# Patient Record
Sex: MALE | Race: WHITE | NOT HISPANIC OR LATINO | Employment: OTHER | URBAN - METROPOLITAN AREA
[De-identification: names, ages, dates, MRNs, and addresses within clinical notes are randomized per-mention and may not be internally consistent; named-entity substitution may affect disease eponyms.]

---

## 2018-07-31 ENCOUNTER — HOSPITAL ENCOUNTER (INPATIENT)
Facility: HOSPITAL | Age: 73
LOS: 7 days | Discharge: HOME HEALTH | DRG: 623 | End: 2018-08-07
Attending: EMERGENCY MEDICINE | Admitting: PODIATRIST
Payer: MEDICARE

## 2018-07-31 ENCOUNTER — APPOINTMENT (INPATIENT)
Dept: NON INVASIVE DIAGNOSTICS | Facility: HOSPITAL | Age: 73
DRG: 623 | End: 2018-07-31
Payer: MEDICARE

## 2018-07-31 ENCOUNTER — APPOINTMENT (INPATIENT)
Dept: MRI IMAGING | Facility: HOSPITAL | Age: 73
DRG: 623 | End: 2018-07-31
Payer: MEDICARE

## 2018-07-31 ENCOUNTER — APPOINTMENT (INPATIENT)
Dept: RADIOLOGY | Facility: HOSPITAL | Age: 73
DRG: 623 | End: 2018-07-31
Payer: MEDICARE

## 2018-07-31 ENCOUNTER — APPOINTMENT (EMERGENCY)
Dept: RADIOLOGY | Facility: HOSPITAL | Age: 73
DRG: 623 | End: 2018-07-31
Payer: MEDICARE

## 2018-07-31 DIAGNOSIS — L02.619 CELLULITIS AND ABSCESS OF FOOT: Primary | ICD-10-CM

## 2018-07-31 DIAGNOSIS — M86.371 CHRONIC MULTIFOCAL OSTEOMYELITIS OF RIGHT FOOT (HCC): ICD-10-CM

## 2018-07-31 DIAGNOSIS — R31.9 HEMATURIA: ICD-10-CM

## 2018-07-31 DIAGNOSIS — I50.9 CONGESTIVE HEART FAILURE, UNSPECIFIED HF CHRONICITY, UNSPECIFIED HEART FAILURE TYPE (HCC): ICD-10-CM

## 2018-07-31 DIAGNOSIS — I25.119 CORONARY ARTERY DISEASE INVOLVING NATIVE HEART WITH ANGINA PECTORIS, UNSPECIFIED VESSEL OR LESION TYPE (HCC): ICD-10-CM

## 2018-07-31 DIAGNOSIS — S91.309A OPEN WOUND OF FOOT: ICD-10-CM

## 2018-07-31 DIAGNOSIS — L03.119 CELLULITIS AND ABSCESS OF FOOT: Primary | ICD-10-CM

## 2018-07-31 DIAGNOSIS — L03.115 CELLULITIS OF RIGHT FOOT: ICD-10-CM

## 2018-07-31 DIAGNOSIS — E11.9 DIET-CONTROLLED DIABETES MELLITUS (HCC): ICD-10-CM

## 2018-07-31 PROBLEM — I25.10 CORONARY ARTERY DISEASE: Status: ACTIVE | Noted: 2017-08-23

## 2018-07-31 PROBLEM — E03.9 HYPOTHYROIDISM: Status: ACTIVE | Noted: 2017-08-23

## 2018-07-31 PROBLEM — R60.9 EDEMA: Status: ACTIVE | Noted: 2017-09-18

## 2018-07-31 PROBLEM — G57.93 NEUROPATHY OF BOTH FEET: Status: ACTIVE | Noted: 2017-10-18

## 2018-07-31 PROBLEM — I73.9 PERIPHERAL VASCULAR DISEASE (HCC): Status: ACTIVE | Noted: 2017-08-23

## 2018-07-31 PROBLEM — E80.6 HYPERBILIRUBINEMIA: Status: ACTIVE | Noted: 2017-11-28

## 2018-07-31 PROBLEM — I50.814 BIVENTRICULAR HEART FAILURE WITH REDUCED LEFT VENTRICULAR FUNCTION (HCC): Status: ACTIVE | Noted: 2017-08-23

## 2018-07-31 LAB
ALBUMIN SERPL BCP-MCNC: 3.4 G/DL (ref 3.5–5)
ALP SERPL-CCNC: 124 U/L (ref 46–116)
ALT SERPL W P-5'-P-CCNC: 17 U/L (ref 12–78)
ANION GAP SERPL CALCULATED.3IONS-SCNC: 7 MMOL/L (ref 4–13)
APTT PPP: 43 SECONDS (ref 24–36)
AST SERPL W P-5'-P-CCNC: 26 U/L (ref 5–45)
ATRIAL RATE: 119 BPM
BASOPHILS # BLD AUTO: 0.03 THOUSANDS/ΜL (ref 0–0.1)
BASOPHILS NFR BLD AUTO: 0 % (ref 0–1)
BILIRUB SERPL-MCNC: 2.29 MG/DL (ref 0.2–1)
BUN SERPL-MCNC: 17 MG/DL (ref 5–25)
CALCIUM SERPL-MCNC: 9.3 MG/DL (ref 8.3–10.1)
CHLORIDE SERPL-SCNC: 98 MMOL/L (ref 100–108)
CO2 SERPL-SCNC: 32 MMOL/L (ref 21–32)
CREAT SERPL-MCNC: 1.12 MG/DL (ref 0.6–1.3)
CRP SERPL QL: 42.1 MG/L
EOSINOPHIL # BLD AUTO: 0.04 THOUSAND/ΜL (ref 0–0.61)
EOSINOPHIL NFR BLD AUTO: 0 % (ref 0–6)
ERYTHROCYTE [DISTWIDTH] IN BLOOD BY AUTOMATED COUNT: 16.2 % (ref 11.6–15.1)
ERYTHROCYTE [SEDIMENTATION RATE] IN BLOOD: 20 MM/HOUR (ref 0–10)
EST. AVERAGE GLUCOSE BLD GHB EST-MCNC: 128 MG/DL
GFR SERPL CREATININE-BSD FRML MDRD: 64 ML/MIN/1.73SQ M
GLUCOSE SERPL-MCNC: 91 MG/DL (ref 65–140)
HBA1C MFR BLD: 6.1 % (ref 4.2–6.3)
HCT VFR BLD AUTO: 49.6 % (ref 36.5–49.3)
HGB BLD-MCNC: 17.8 G/DL (ref 12–17)
INR PPP: 1.5 (ref 0.86–1.17)
LYMPHOCYTES # BLD AUTO: 1.43 THOUSANDS/ΜL (ref 0.6–4.47)
LYMPHOCYTES NFR BLD AUTO: 14 % (ref 14–44)
MCH RBC QN AUTO: 36.6 PG (ref 26.8–34.3)
MCHC RBC AUTO-ENTMCNC: 35.9 G/DL (ref 31.4–37.4)
MCV RBC AUTO: 102 FL (ref 82–98)
MONOCYTES # BLD AUTO: 0.57 THOUSAND/ΜL (ref 0.17–1.22)
MONOCYTES NFR BLD AUTO: 6 % (ref 4–12)
NEUTROPHILS # BLD AUTO: 7.89 THOUSANDS/ΜL (ref 1.85–7.62)
NEUTS SEG NFR BLD AUTO: 79 % (ref 43–75)
NRBC BLD AUTO-RTO: 0 /100 WBCS
PLATELET # BLD AUTO: 161 THOUSANDS/UL (ref 149–390)
PLATELET # BLD AUTO: 163 THOUSANDS/UL (ref 149–390)
PMV BLD AUTO: 10.9 FL (ref 8.9–12.7)
PMV BLD AUTO: 11 FL (ref 8.9–12.7)
POTASSIUM SERPL-SCNC: 3.8 MMOL/L (ref 3.5–5.3)
PROT SERPL-MCNC: 7.8 G/DL (ref 6.4–8.2)
PROTHROMBIN TIME: 18.2 SECONDS (ref 11.8–14.2)
QRS AXIS: 104 DEGREES
QRSD INTERVAL: 86 MS
QT INTERVAL: 354 MS
QTC INTERVAL: 474 MS
RBC # BLD AUTO: 4.86 MILLION/UL (ref 3.88–5.62)
SODIUM SERPL-SCNC: 137 MMOL/L (ref 136–145)
T WAVE AXIS: -35 DEGREES
URATE SERPL-MCNC: 5.2 MG/DL (ref 4.2–8)
VENTRICULAR RATE: 108 BPM
WBC # BLD AUTO: 9.96 THOUSAND/UL (ref 4.31–10.16)

## 2018-07-31 PROCEDURE — 93925 LOWER EXTREMITY STUDY: CPT

## 2018-07-31 PROCEDURE — 85730 THROMBOPLASTIN TIME PARTIAL: CPT | Performed by: EMERGENCY MEDICINE

## 2018-07-31 PROCEDURE — 73718 MRI LOWER EXTREMITY W/O DYE: CPT

## 2018-07-31 PROCEDURE — 87040 BLOOD CULTURE FOR BACTERIA: CPT | Performed by: STUDENT IN AN ORGANIZED HEALTH CARE EDUCATION/TRAINING PROGRAM

## 2018-07-31 PROCEDURE — 87070 CULTURE OTHR SPECIMN AEROBIC: CPT | Performed by: STUDENT IN AN ORGANIZED HEALTH CARE EDUCATION/TRAINING PROGRAM

## 2018-07-31 PROCEDURE — 87186 SC STD MICRODIL/AGAR DIL: CPT | Performed by: STUDENT IN AN ORGANIZED HEALTH CARE EDUCATION/TRAINING PROGRAM

## 2018-07-31 PROCEDURE — 84550 ASSAY OF BLOOD/URIC ACID: CPT | Performed by: STUDENT IN AN ORGANIZED HEALTH CARE EDUCATION/TRAINING PROGRAM

## 2018-07-31 PROCEDURE — 85025 COMPLETE CBC W/AUTO DIFF WBC: CPT | Performed by: EMERGENCY MEDICINE

## 2018-07-31 PROCEDURE — 85652 RBC SED RATE AUTOMATED: CPT | Performed by: EMERGENCY MEDICINE

## 2018-07-31 PROCEDURE — 71046 X-RAY EXAM CHEST 2 VIEWS: CPT

## 2018-07-31 PROCEDURE — 36415 COLL VENOUS BLD VENIPUNCTURE: CPT | Performed by: EMERGENCY MEDICINE

## 2018-07-31 PROCEDURE — 99285 EMERGENCY DEPT VISIT HI MDM: CPT

## 2018-07-31 PROCEDURE — 93005 ELECTROCARDIOGRAM TRACING: CPT

## 2018-07-31 PROCEDURE — 80053 COMPREHEN METABOLIC PANEL: CPT | Performed by: EMERGENCY MEDICINE

## 2018-07-31 PROCEDURE — 87205 SMEAR GRAM STAIN: CPT | Performed by: STUDENT IN AN ORGANIZED HEALTH CARE EDUCATION/TRAINING PROGRAM

## 2018-07-31 PROCEDURE — 85049 AUTOMATED PLATELET COUNT: CPT | Performed by: STUDENT IN AN ORGANIZED HEALTH CARE EDUCATION/TRAINING PROGRAM

## 2018-07-31 PROCEDURE — 87147 CULTURE TYPE IMMUNOLOGIC: CPT | Performed by: STUDENT IN AN ORGANIZED HEALTH CARE EDUCATION/TRAINING PROGRAM

## 2018-07-31 PROCEDURE — 87077 CULTURE AEROBIC IDENTIFY: CPT | Performed by: STUDENT IN AN ORGANIZED HEALTH CARE EDUCATION/TRAINING PROGRAM

## 2018-07-31 PROCEDURE — 93010 ELECTROCARDIOGRAM REPORT: CPT | Performed by: INTERNAL MEDICINE

## 2018-07-31 PROCEDURE — 93923 UPR/LXTR ART STDY 3+ LVLS: CPT

## 2018-07-31 PROCEDURE — 73630 X-RAY EXAM OF FOOT: CPT

## 2018-07-31 PROCEDURE — 86140 C-REACTIVE PROTEIN: CPT | Performed by: EMERGENCY MEDICINE

## 2018-07-31 PROCEDURE — 83036 HEMOGLOBIN GLYCOSYLATED A1C: CPT | Performed by: STUDENT IN AN ORGANIZED HEALTH CARE EDUCATION/TRAINING PROGRAM

## 2018-07-31 PROCEDURE — 93922 UPR/L XTREMITY ART 2 LEVELS: CPT | Performed by: SURGERY

## 2018-07-31 PROCEDURE — 93925 LOWER EXTREMITY STUDY: CPT | Performed by: SURGERY

## 2018-07-31 PROCEDURE — 85610 PROTHROMBIN TIME: CPT | Performed by: EMERGENCY MEDICINE

## 2018-07-31 RX ORDER — DABIGATRAN ETEXILATE 150 MG/1
150 CAPSULE, COATED PELLETS ORAL 2 TIMES DAILY
Status: DISCONTINUED | OUTPATIENT
Start: 2018-07-31 | End: 2018-08-04

## 2018-07-31 RX ORDER — DABIGATRAN ETEXILATE 150 MG/1
150 CAPSULE, COATED PELLETS ORAL 2 TIMES DAILY
COMMUNITY

## 2018-07-31 RX ORDER — POTASSIUM CHLORIDE 20MEQ/15ML
20 LIQUID (ML) ORAL 2 TIMES DAILY
Status: DISCONTINUED | OUTPATIENT
Start: 2018-07-31 | End: 2018-08-01

## 2018-07-31 RX ORDER — ALLOPURINOL 300 MG/1
300 TABLET ORAL
COMMUNITY
Start: 2018-05-07

## 2018-07-31 RX ORDER — METOLAZONE 2.5 MG/1
2.5 TABLET ORAL 3 TIMES WEEKLY
COMMUNITY

## 2018-07-31 RX ORDER — GABAPENTIN 300 MG/1
300 CAPSULE ORAL 3 TIMES DAILY
COMMUNITY
Start: 2017-11-28

## 2018-07-31 RX ORDER — METRONIDAZOLE 500 MG/1
500 TABLET ORAL EVERY 8 HOURS SCHEDULED
Status: DISCONTINUED | OUTPATIENT
Start: 2018-07-31 | End: 2018-08-02

## 2018-07-31 RX ORDER — UREA 10 %
500 LOTION (ML) TOPICAL DAILY
COMMUNITY

## 2018-07-31 RX ORDER — LEVOTHYROXINE SODIUM 0.05 MG/1
50 TABLET ORAL
Status: DISCONTINUED | OUTPATIENT
Start: 2018-08-01 | End: 2018-08-07 | Stop reason: HOSPADM

## 2018-07-31 RX ORDER — ACETAMINOPHEN 500 MG
500 TABLET ORAL EVERY 6 HOURS PRN
COMMUNITY

## 2018-07-31 RX ORDER — METOLAZONE 2.5 MG/1
2.5 TABLET ORAL 3 TIMES WEEKLY
Status: DISCONTINUED | OUTPATIENT
Start: 2018-08-01 | End: 2018-08-07 | Stop reason: HOSPADM

## 2018-07-31 RX ORDER — ACETAMINOPHEN 325 MG/1
650 TABLET ORAL EVERY 6 HOURS PRN
Status: DISCONTINUED | OUTPATIENT
Start: 2018-07-31 | End: 2018-08-07 | Stop reason: HOSPADM

## 2018-07-31 RX ORDER — ALLOPURINOL 100 MG/1
300 TABLET ORAL DAILY
Status: DISCONTINUED | OUTPATIENT
Start: 2018-08-01 | End: 2018-08-07 | Stop reason: HOSPADM

## 2018-07-31 RX ORDER — LEVOTHYROXINE SODIUM 0.05 MG/1
50 TABLET ORAL DAILY
COMMUNITY

## 2018-07-31 RX ORDER — COLCHICINE 0.6 MG/1
0.6 TABLET ORAL 2 TIMES DAILY
COMMUNITY
Start: 2017-08-23

## 2018-07-31 RX ORDER — EPLERENONE 25 MG/1
25 TABLET, FILM COATED ORAL DAILY
COMMUNITY
End: 2019-05-14

## 2018-07-31 RX ORDER — FUROSEMIDE 40 MG/1
40 TABLET ORAL DAILY
Status: DISCONTINUED | OUTPATIENT
Start: 2018-08-01 | End: 2018-08-07 | Stop reason: HOSPADM

## 2018-07-31 RX ORDER — GABAPENTIN 300 MG/1
300 CAPSULE ORAL 3 TIMES DAILY
Status: DISCONTINUED | OUTPATIENT
Start: 2018-07-31 | End: 2018-08-07 | Stop reason: HOSPADM

## 2018-07-31 RX ORDER — UREA 10 %
500 LOTION (ML) TOPICAL DAILY
Status: DISCONTINUED | OUTPATIENT
Start: 2018-08-01 | End: 2018-08-07 | Stop reason: HOSPADM

## 2018-07-31 RX ORDER — POTASSIUM CHLORIDE 750 MG/1
20 CAPSULE, EXTENDED RELEASE ORAL 2 TIMES DAILY
COMMUNITY

## 2018-07-31 RX ORDER — EPLERENONE 25 MG/1
25 TABLET, FILM COATED ORAL DAILY
Status: DISCONTINUED | OUTPATIENT
Start: 2018-08-01 | End: 2018-08-07 | Stop reason: HOSPADM

## 2018-07-31 RX ORDER — FUROSEMIDE 40 MG/1
40 TABLET ORAL DAILY
COMMUNITY
Start: 2018-02-15

## 2018-07-31 RX ORDER — COLCHICINE 0.6 MG/1
0.6 TABLET ORAL 2 TIMES DAILY
Status: DISCONTINUED | OUTPATIENT
Start: 2018-07-31 | End: 2018-08-03

## 2018-07-31 RX ADMIN — CEFAZOLIN SODIUM 2000 MG: 2 SOLUTION INTRAVENOUS at 17:56

## 2018-07-31 RX ADMIN — DABIGATRAN ETEXILATE MESYLATE 150 MG: 150 CAPSULE ORAL at 18:28

## 2018-07-31 RX ADMIN — GABAPENTIN 300 MG: 300 CAPSULE ORAL at 21:20

## 2018-07-31 RX ADMIN — METRONIDAZOLE 500 MG: 500 TABLET ORAL at 21:20

## 2018-07-31 RX ADMIN — COLCHICINE 0.6 MG: 0.6 TABLET, FILM COATED ORAL at 18:01

## 2018-07-31 RX ADMIN — POTASSIUM CHLORIDE 20 MEQ: 20 SOLUTION ORAL at 18:01

## 2018-07-31 RX ADMIN — METRONIDAZOLE 500 MG: 500 TABLET ORAL at 18:04

## 2018-07-31 RX ADMIN — ENOXAPARIN SODIUM 40 MG: 40 INJECTION SUBCUTANEOUS at 18:01

## 2018-07-31 NOTE — CASE MANAGEMENT
Initial Clinical Review    Admission: Date/Time/Statement: 7/31/18 @ 1555 Inpatient Written     Orders Placed This Encounter   Procedures    Inpatient Admission     Standing Status:   Standing     Number of Occurrences:   1     Order Specific Question:   Admitting Physician     Answer:   Grant Chamberlain [[id number]]     Order Specific Question:   Level of Care     Answer:   Med Surg [16]     Order Specific Question:   Estimated length of stay     Answer:   More than 2 Midnights     Order Specific Question:   Certification     Answer:   I certify that inpatient services are medically necessary for this patient for a duration of greater than two midnights  See H&P and MD Progress Notes for additional information about the patient's course of treatment  Comments:   IV abx, psb intervention         ED: Date/Time/Mode of Arrival:   ED Arrival Information     Expected Arrival Acuity Means of Arrival Escorted By Service Admission Type    - 7/31/2018 12:59 Urgent [facility]In [facility]iat[facility] Urgent    Arrival Complaint    wound check          Chief Complaint:   Chief Complaint   Patient presents with    Foot Swelling     pt c/o swelling and pain to right foot  thinks it is infected  just came back from a 9 days cruise  also states on the left foot one of the toes does not appear to be healing right from an old wound  History of Illness: Luke Nieves is a 74 y o  male who presents with right foot ulcer and left 3rd toe ulcer that he has been seeing Dr Dede Curtis for as an outpatient  He states that these have been wounds for about 2 weeks and prior to that they were just calluses  He saw Dr Winsome Lopez in the office today and was sent to the hospital given increasing redness  The patient denies vomiting, fevers, chills however he has had some nausea the past few days  He weightbears in normal shoes  He states he has diet-controlled diabetes but he has had neuropathy for a while and takes gabapentin for this    He states he has a had a left lower extremity stent placed by Dr Ashley Bragg in the [facility] about 3 years ago and follows up with him regularly  He states he is retired and was on a cruise in [place] and got home about 2 days ago  ED Vital Signs:   ED Triage Vitals   Temperature Pulse Respirations Blood Pressure SpO2   07/31/18 1308 07/31/18 1308 07/31/18 1308 07/31/18 1308 07/31/18 1308   98 4 °F (36 9 °C) (!) 108 18 128/97 96 %      Temp Source Heart Rate Source Patient Position - Orthostatic VS BP Location FiO2 (%)   07/31/18 1308 07/31/18 1550 07/31/18 1550 07/31/18 1550 --   Temporal Monitor Lying Right arm       Pain Score       07/31/18 1308       4        Wt Readings from Last 1 Encounters:   07/31/18 97 7 kg (215 lb 6 2 oz)       Vital Signs (abnormal): temp 99 9,     Abnormal Labs/Diagnostic Test Results:   Hemoglobin 17 8     Hematocrit 49 6          MCH 36 6     RDW 16 2     Neutrophils Relative 79     Neutrophils Absolute 7 89       Chloride 98     Alkaline Phosphatase 124     Albumin 3 4     Total Bilirubin 2 29       Sed Rate 20       CRP 42 1       Protime 18 2     INR 1 50       XR right and left foot:  No radiographic evidence of osteomyelitis  ED Treatment:   Medication Administration from 07/31/2018 1259 to 07/31/2018 1710     None          Past Medical/Surgical History:    Active Ambulatory Problems     Diagnosis Date Noted    [facility] failure [facility] function [facility] ) 08/23/2017    Coronary artery disease 08/23/2017    Diet-controlled diabetes mellitus ([facility] ) 08/23/2017    Edema 09/18/2017    Hyperbilirubinemia 11/28/2017    Hypothyroidism 08/23/2017    Neuropathy of both feet 10/18/2017    Peripheral vascular disease ([facility] ) 08/23/2017    Essential hypertension 09/18/2013     Resolved Ambulatory Problems     Diagnosis Date Noted    No Resolved Ambulatory Problems     Past Medical History:   Diagnosis Date    Cardiac disease     CHF (congestive heart failure) (HCC)     Coronary artery disease     Diabetes mellitus ([facility] )     Disease of thyroid gland     Fracture, humerus     GERD (gastroesophageal reflux disease)     Hypertension     Prediabetes     Wrist fracture        Admitting Diagnosis: Cellulitis and abscess of foot [L03 119, L02 619]  Open wound of foot [S91 309A]  Visit for wound check [Z51 89]  Coronary artery disease involving native heart with angina pectoris, unspecified vessel or lesion type ([place] [address] ) [I25 119]  Congestive heart failure, unspecified HF chronicity, unspecified heart failure type ([place] [address] ) [I50 9]    Age/Sex: 74 y o  male    Assessment/Plan:   3 68-year-old male with right plantar 5th metatarsal ulcer Jennings 2 with cellulitis secondary to pressure and neuropathy  2   Graham Hoose 1 ulcer to the distal left 3rd toe secondary to pressure and neuropathy  3  DTI dorsal right hallux  4   AFib with history of CABG x4 in 2011-c/w pradaxa  5  History of CHF  6  Diet-controlled diabetes mellitus with neuropathy-c/w gabapentin  7  Hypothyroidism  8  HTN  9  Hyperbilirubinemia  10  peripheral vascular disease-left lower extremity stent placement roughly 3 years ago  11  Hx Gout  12  Code status:  Level 1  13    DVT prophylaxis:  Lovenox     -ordered right foot MRI to rule out osteomyelitis and abscess to plantar 5th metatarsal wound  -bilateral foot x-rays show no radiographic evidence of osteomyelitis  -patient is diet controlled diabetes, have ordered new HbA1c  -ESR, CRP pending  -took wound cultures to right foot wound  -there is surrounding cellulitis to the right foot wound however there is no pamela pus, no malodor, this wound does not probe to bone but does have tracking  -distal left 3rd toe appears to be stable without probe to bone, no clinical signs of infection  -dressed wounds with Betadine, did dry sterile dressing  -due to nonpalpable right DP and the fold Doppler signal have ordered leads to evaluate vascular status given history of left lower extremity stent 3 years ago  -place patient on Ancef/Flagyl as patient has failed p o  doxy outpatient and placed consult for Infectious Disease for IV antibiotic recommendations  -placed slim consult for medical management of #4-9 and have continued home meds for these  -will evaluate results of leads and may consider vascular surgery consult  -will see tomorrow with attending and update plan    Admission Orders:  Surgical shoe  EKG  CXR  MRI right foot  VAS lower limb arterial duplex  Consult ID, internal med  Sequential compression device  Blood and wound cxs pending    Scheduled Meds:   Current Facility-Administered Medications:  acetaminophen 650 mg Oral Q6H PRN   [START ON 8/1/2018] allopurinol 300 mg Oral Daily   cefazolin 2,000 mg Intravenous Q8H   colchicine 0 6 mg Oral BID   dabigatran etexilate 150 mg Oral BID   enoxaparin 40 mg Subcutaneous BID   [START ON 8/1/2018] eplerenone 25 mg Oral Daily   [START ON 8/1/2018] furosemide 40 mg Oral Daily   gabapentin 300 mg Oral TID   [START ON 8/1/2018] levothyroxine 50 mcg Oral Early Morning   [START ON 8/1/2018] magnesium gluconate 500 mg Oral Daily   [START ON 8/1/2018] metolazone 2 5 mg Oral Once per day on Mon Wed Fri   metroNIDAZOLE 500 mg Oral Q8H [facility]   potassium chloride 20 mEq Oral BID     Continuous Infusions:    PRN Meds:   acetaminophen    Thank you,  Jazmín Aqq  291 Utilization Review Department  Phone: [phone]; Fax [phone]  ATTENTION: The Network Utilization Review Department is now centralized for our 9 Facilities  Make a note that we have a new phone and fax numbers for our Department  Please call with any questions or concerns to [phone] and carefully follow the prompts so that you are directed to the right person  All voicemails are confidential  Fax any determinations, approvals, denials, and requests for initial or continue stay review clinical to [phone]   Due to HIGH CALL volume, it would be easier if you could please send faxed requests to expedite your requests and in part, help us provide discharge notifications faster

## 2018-07-31 NOTE — H&P
H&P Exam - Danis Oro 74 y o  male MRN: [id number]    Unit/Bed#: ED 17 Encounter: [id number]      Assessment/Plan     3 25-year-old male with right plantar 5th metatarsal ulcer Jennings 2 with cellulitis secondary to pressure and neuropathy  2   Doy Diones 1 ulcer to the distal left 3rd toe secondary to pressure and neuropathy  3  DTI dorsal right hallux  4   AFib with history of CABG x4 in 2011-c/w pradaxa  5  History of CHF  6  Diet-controlled diabetes mellitus with neuropathy-c/w gabapentin  7  Hypothyroidism  8  HTN  9  Hyperbilirubinemia  10  peripheral vascular disease-left lower extremity stent placement roughly 3 years ago  11  Hx Gout  12  Code status:  Level 1  13    DVT prophylaxis:  Lovenox    -ordered right foot MRI to rule out osteomyelitis and abscess to plantar 5th metatarsal wound  -bilateral foot x-rays show no radiographic evidence of osteomyelitis  -patient is diet controlled diabetes, have ordered new HbA1c  -ESR, CRP pending  -patient is afebrile, no acute distress, no leukocytosis  -took wound cultures to right foot wound  -there is surrounding cellulitis to the right foot wound however there is no pamela pus, no malodor, this wound does not probe to bone but does have tracking  -distal left 3rd toe appears to be stable without probe to bone, no clinical signs of infection  -dressed wounds with Betadine, did dry sterile dressing  -due to nonpalpable right DP and the fold Doppler signal have ordered leads to evaluate vascular status given history of left lower extremity stent 3 years ago  -place patient on Ancef/Flagyl as patient has failed p o  doxy outpatient and placed consult for Infectious Disease for IV antibiotic recommendations  -placed slim consult for medical management of #4-9 and have continued home meds for these  -will evaluate results of leads and may consider vascular surgery consult  -will see tomorrow with attending and update plan      History of Present Illness    HPI:  Wili Neville is a 74 y o  male who presents with right foot ulcer and left 3rd toe ulcer that he has been seeing Dr Gely Rivera for as an outpatient  He states that these have been wounds for about 2 weeks and prior to that they were just calluses  He saw Dr Renu Tello in the office today and was sent to the hospital given increasing redness  The patient denies vomiting, fevers, chills however he has had some nausea the past few days  He weightbears in normal shoes  He states he has diet-controlled diabetes but he has had neuropathy for a while and takes gabapentin for this  He states he has a had a left lower extremity stent placed by Dr José Antonio Valenzuela in the [facility] about 3 years ago and follows up with him regularly  He states he is retired and was on a cruise in [place] and got home about 2 days ago  Review of Systems   Constitutional: Negative for activity change, appetite change, chills, fatigue and fever  HENT: Positive for congestion  Respiratory: Negative for apnea, cough, chest tightness, shortness of breath and wheezing  Cardiovascular: Positive for leg swelling  Negative for chest pain  Gastrointestinal: Positive for nausea  Negative for abdominal distention and abdominal pain  Musculoskeletal: Positive for joint swelling  Skin: Positive for color change and wound  Neurological: Positive for numbness  Negative for dizziness and weakness  Psychiatric/Behavioral: Negative for agitation, confusion and self-injury  The patient is not nervous/anxious          Historical Information   Past Medical History:   Diagnosis Date    Cardiac disease     [facility]estive [facility] failure) [facility] )     Coronary artery disease     [facility]     Disease of thyroid gland     Fracture, humerus     GERD (gastroesophageal reflux disease)     Hypertension     Prediabetes     Wrist fracture      Past Surgical History:   Procedure Laterality Date    CORONARY ARTERY BYPASS GRAFT Social History   History   Alcohol Use    Yes     Comment: "very little"     History   Drug Use No     History   Smoking Status    Former Smoker   Smokeless Tobacco    Never Used     Family History: non-contributory    Meds/Allergies   all medications and allergies reviewed  Allergies   Allergen Reactions    Ace Inhibitors     Other      High fructose corn syrup    Statins        Objective   Vitals: Blood pressure 128/97, pulse (!) 108, temperature 98 4 °F (36 9 °C), temperature source Temporal, resp  rate 18, weight 97 5 kg (215 lb), SpO2 96 %  No intake or output data in the 24 hours ending 07/31/18 1549    Invasive Devices     Peripheral Intravenous Line            Peripheral IV 07/31/18 Left Antecubital less than 1 day                Physical Exam   Constitutional: He is oriented to person, place, and time  He appears well-developed and well-nourished  HENT:   Head: Normocephalic and atraumatic  Eyes: Conjunctivae are normal  Pupils are equal, round, and reactive to light  Neck: Normal range of motion  Neck supple  Cardiovascular: Normal rate and normal heart sounds  Right PT pulse palpable, right DP pulse nonpalpable however multiple biphasic  Left DP/PT pulses palpable  Pedal hair is absent  Spider veins are present  Capillary refill time is less than 3 sec  Atrophic skin is noted  Pulmonary/Chest: Effort normal  No respiratory distress  He has wheezes  Abdominal: Soft  He exhibits no distension  Musculoskeletal: Normal range of motion  Digital range of motion is intact  Manual muscle testing is 5/5 in all 4 planes  There is no calf tenderness to palpation bilateral   However there is plus one pitting edema to bilateral lower extremity  Hammertoes present bilateral     Neurological: He is alert and oriented to person, place, and time  He has normal reflexes  Skin: Skin is warm and dry  Ulcer right sub met 5 measures 5 x 2 7 cm    There is a central area of depth and measures 0 8 x 0 8 x 0 2 cm and tracks laterally by 1 4 cm and tracks medially by 0 5 cm  This however does not probe to bone  Wound appears granular mixed with devitalized tissue without pamela pus however there is mild renita wound cellulitis  There is also a DTI to the dorsal right hallux IPJ, callus distal left hallux, ulcer Jennings 1 to the distal tip of the left 3rd toe  This does not have any pamela pus, no ascending cellulitis, no malodor, no active drainage  Psychiatric: He has a normal mood and affect  His behavior is normal                  Code Status: No Order  Advance Directive and Living Will:      Power of :    POLST:      Counseling / Coordination of Care  Total floor / unit time spent today 30 minutes  Greater than 50% of total time was spent with the patient and / or family counseling and / or coordination of care

## 2018-07-31 NOTE — ED PROVIDER NOTES
History  Chief Complaint   Patient presents with    Foot Swelling     pt c/o swelling and pain to right foot  thinks it is infected  just came back from a 9 days cruise  also states on the left foot one of the toes does not appear to be healing right from an old wound  History provided by:  Patient   used: No    Leg Pain   Location:  Foot  Injury: no    Foot location:  Sole of R foot  Pain details:     Quality:  Aching (Wound)    Radiates to:  Does not radiate    Severity:  Moderate    Onset quality:  Gradual    Timing:  Intermittent    Progression:  Waxing and waning  Chronicity:  Recurrent  Dislocation: no    Foreign body present:  Unable to specify  Tetanus status:  Unknown  Prior injury to area:  Unable to specify  Relieved by:  Nothing  Worsened by:  Nothing  Ineffective treatments:  None tried  Associated symptoms: no back pain, no fever and no neck pain        Prior to Admission Medications   Prescriptions Last Dose Informant Patient Reported? Taking?    Barberry-Oreg Grape-Goldenseal (BERBERINE COMPLEX PO) 7/30/2018 at Unknown time  Yes Yes   Sig: Take 400 mg by mouth daily   Turmeric Curcumin 500 MG CAPS 7/30/2018 at Unknown time  Yes Yes   Sig: Take 1,000 mg by mouth daily   Ubiquinol 300 MG CAPS 7/30/2018 at Unknown time  Yes Yes   Sig: Take 300 mg by mouth daily   acetaminophen (TYLENOL) 500 mg tablet 7/30/2018 at Unknown time  Yes Yes   Sig: Take 500 mg by mouth every 6 (six) hours as needed for mild pain   allopurinol (ZYLOPRIM) 300 mg tablet 7/30/2018 at Unknown time  Yes Yes   Sig: Take 300 mg by mouth   colchicine (COLCRYS) 0 6 mg tablet 7/30/2018 at Unknown time  Yes Yes   Sig: Take 0 6 mg by mouth 2 (two) times a day     dabigatran etexilate (PRADAXA) 150 mg capsu 7/30/2018 at Unknown time  Yes Yes   Sig: Take 150 mg by mouth 2 (two) times a day   eplerenone (INSPRA) 25 mg tablet 7/30/2018 at Unknown time  Yes Yes   Sig: Take 25 mg by mouth daily   furosemide (LASIX) 40 mg tablet 7/30/2018 at Unknown time  Yes Yes   Sig: Take 40 mg by mouth daily   gabapentin (NEURONTIN) 300 mg capsule 7/30/2018 at Unknown time  Yes Yes   Sig: Take 300 mg by mouth 3 (three) times a day     levothyroxine 50 mcg tablet 7/31/2018 at Unknown time  Yes Yes   Sig: Take 50 mcg by mouth daily   magnesium gluconate (MAGONATE) 500 mg tablet 7/30/2018 at Unknown time  Yes Yes   Sig: Take 500 mg by mouth daily   metolazone (ZAROXOLYN) 2 5 mg tablet Past Week at Unknown time  Yes Yes   Sig: Take 2 5 mg by mouth 3 (three) times a week   potassium chloride (MICRO-K) 10 MEQ CR capsule 7/30/2018 at Unknown time  Yes Yes   Sig: Take 20 mEq by mouth 2 (two) times a day      Facility-Administered Medications: None       Past Medical History:   Diagnosis Date    Cardiac disease     [facility]F [facility]congestive [facility] failure) ([facility] )     Coronary artery disease     Diabetes mellitus ([facility] )     Disease of thyroid gland     Fracture, humerus     GERD (gastroesophageal reflux disease)     Hypertension     Prediabetes     Wrist fracture        Past Surgical History:   Procedure Laterality Date    CORONARY ARTERY BYPASS GRAFT         History reviewed  No pertinent family history  I have reviewed and agree with the history as documented  Social History   Substance Use Topics    Smoking status: Former Smoker    Smokeless tobacco: Never Used    Alcohol use Yes      Comment: "very little"        Review of Systems   Constitutional: Negative for activity change, chills and fever  HENT: Negative for facial swelling, sore throat and trouble swallowing  Eyes: Negative for pain and visual disturbance  Respiratory: Negative for cough, chest tightness and shortness of breath  Cardiovascular: Negative for chest pain and leg swelling  Gastrointestinal: Negative for abdominal pain, blood in stool, diarrhea, nausea and vomiting  Genitourinary: Negative for dysuria and flank pain     Musculoskeletal: Negative for back pain, neck pain and neck stiffness  Skin: Negative for pallor and rash  Allergic/Immunologic: Negative for environmental allergies and immunocompromised state  Neurological: Negative for dizziness and headaches  Hematological: Negative for adenopathy  Does not bruise/bleed easily  Psychiatric/Behavioral: Negative for agitation and behavioral problems  All other systems reviewed and are negative  Physical Exam  Physical Exam   Constitutional: He is oriented to person, place, and time  He appears well-developed and well-nourished  No distress  HENT:   Head: Normocephalic and atraumatic  Eyes: EOM are normal    Neck: Normal range of motion  Neck supple  Cardiovascular: Normal rate, regular rhythm, normal heart sounds and intact distal pulses  Pulmonary/Chest: Effort normal and breath sounds normal    Abdominal: Soft  Bowel sounds are normal  There is no tenderness  There is no rebound and no guarding  Musculoskeletal: Normal range of motion  Open wound at lateral aspect of sole of right foot, no active drainage; wound over 3rd toe left foot without active drainage   Neurological: He is alert and oriented to person, place, and time  Skin: Skin is warm and dry  Psychiatric: He has a normal mood and affect  Nursing note and vitals reviewed        Vital Signs  ED Triage Vitals   Temperature Pulse Respirations Blood Pressure SpO2   07/31/18 1308 07/31/18 1308 07/31/18 1308 07/31/18 1308 07/31/18 1308   98 4 °F (36 9 °C) (!) 108 18 128/97 96 %      Temp Source Heart Rate Source Patient Position - Orthostatic VS BP Location FiO2 (%)   07/31/18 1308 07/31/18 1550 07/31/18 1550 07/31/18 1550 --   Temporal Monitor Lying Right arm       Pain Score       07/31/18 1308       4           Vitals:    07/31/18 1308 07/31/18 1550   BP: 128/97 125/87   Pulse: (!) 108 94   Patient Position - Orthostatic VS:  Lying       Visual Acuity      ED Medications  Medications - No data to display    Diagnostic Studies  Results Reviewed     Procedure Component Value Units Date/Time    Uric acid [[id number]] Collected:  07/31/18 1604    Lab Status: In process Specimen:  Blood from Arm, Left Updated:  07/31/18 1607    Wound culture and Gram stain [[id number]]     Lab Status:  No result Specimen:  Wound from Foot, Right     Protime-INR [[id number]] Collected:  07/31/18 1544    Lab Status: In process Specimen:  Blood from Arm, Left Updated:  07/31/18 1557    APTT [[id number]] Collected:  07/31/18 1544    Lab Status: In process Specimen:  Blood from Arm, Left Updated:  07/31/18 1557    Sedimentation rate, automated [[id number]]  (Abnormal) Collected:  07/31/18 1434    Lab Status:  Final result Specimen:  Blood from Arm, Left Updated:  07/31/18 1556     Sed Rate 20 (H) mm/hour     C-reactive protein [[id number]]  (Abnormal) Collected:  07/31/18 1434    Lab Status:  Final result Specimen:  Blood from Arm, Left Updated:  07/31/18 1551     CRP 42 1 (H) mg/L     Comprehensive metabolic panel [[id number]]  (Abnormal) Collected:  07/31/18 1434    Lab Status:  Final result Specimen:  Blood from Arm, Left Updated:  07/31/18 1506     Sodium 137 mmol/L      Potassium 3 8 mmol/L      Chloride 98 (L) mmol/L      CO2 32 mmol/L      Anion Gap 7 mmol/L      BUN 17 mg/dL      Creatinine 1 12 mg/dL      Glucose 91 mg/dL      Calcium 9 3 mg/dL      AST 26 U/L      ALT 17 U/L      Alkaline Phosphatase 124 (H) U/L      Total Protein 7 8 g/dL      Albumin 3 4 (L) g/dL      Total Bilirubin 2 29 (H) mg/dL      eGFR 64 ml/min/1 73sq m     Narrative:         National Kidney Disease Education Program recommendations are as follows:  GFR calculation is accurate only with a steady state creatinine  Chronic Kidney disease less than 60 ml/min/1 73 sq  meters  Kidney failure less than 15 ml/min/1 73 sq  meters      CBC and differential [[id number]]  (Abnormal) Collected:  07/31/18 1434    Lab Status:  Final result Specimen:  Blood from Arm, Left Updated:  07/31/18 1446     WBC 9 96 Thousand/uL      RBC 4 86 Million/uL      Hemoglobin 17 8 (H) g/dL      Hematocrit 49 6 (H) %       (H) fL      MCH 36 6 (H) pg      MCHC 35 9 g/dL      RDW 16 2 (H) %      MPV 10 9 fL      Platelets 314 Thousands/uL      nRBC 0 /100 WBCs      Neutrophils Relative 79 (H) %      Lymphocytes Relative 14 %      Monocytes Relative 6 %      Eosinophils Relative 0 %      Basophils Relative 0 %      Neutrophils Absolute 7 89 (H) Thousands/µL      Lymphocytes Absolute 1 43 Thousands/µL      Monocytes Absolute 0 57 Thousand/µL      Eosinophils Absolute 0 04 Thousand/µL      Basophils Absolute 0 03 Thousands/µL                  XR foot 3+ views LEFT   Final Result by Norma So MD (07/31 1542)      No radiographic evidence of osteomyelitis  Workstation performed: [id number]         XR foot 3+ views RIGHT   Final Result by Norma So MD (07/31 1541)      No radiographic evidence of osteomyelitis  Workstation performed: [id number]                    Procedures  Procedures       Phone Contacts  ED Phone Contact    ED Course  ED Course as of Jul 31 1615   Tue Jul 31, 2018   1514  Labs, XR reviewed, CBC; CMP within normal limits;  discussed with Podiatry resident, will see the patient  [phone] Patient seen by Podiatry, will admit to Dr Sabrina Mullins Service  MDM  Number of Diagnoses or Management Options  Open wound of foot: new and requires workup  Diagnosis management comments: Patient is a 78-year-old male, history of peripheral vascular disease, atrial fibrillation, on Pradaxa, comes in with complaint of worsening right foot wound, was seen at [place] [address] office today, was told to go to the ER, denies fever, chills  No prior calls received in the ER for patient's visit, therefore ER evaluation was done   On exam no acute distress:  Vital signs are stable, afebrile, ulcerated wound noted at the sole of the right foot laterally, a dry wound also noted at the 3rd to the left foot  Will check labs including CBC, CMP, sed rate, CRP, x-rays of foot for possible osteomyelitis versus deep infection; will consult Podiatry  Amount and/or Complexity of Data Reviewed  Clinical lab tests: ordered and reviewed  Tests in the radiology section of CPT®: ordered and reviewed  Tests in the medicine section of CPT®: reviewed and ordered  Discuss the patient with other providers: yes  Independent visualization of images, tracings, or specimens: yes      CritCare Time    Disposition  Final diagnoses:   Open wound of foot     Time reflects when diagnosis was documented in both MDM as applicable and the Disposition within this note     Time User Action Codes Description Comment    7/31/2018  3:49 PM Becky Tran Add [L03 119,  L02 619] Cellulitis and abscess of foot     7/31/2018  3:49 PM Becky Tran Add [I50 9] Congestive heart failure, unspecified HF chronicity, unspecified heart failure type ([place] [address] )     7/31/2018  3:55 PM Becky Tran Add [I25 119] Coronary artery disease involving native heart with angina pectoris, unspecified vessel or lesion type ([place] [address] )     7/31/2018  3:55 PM Becky Tran Modify [I25 119] Coronary artery disease involving native heart with angina pectoris, unspecified vessel or lesion type ([place] [address] )     7/31/2018  4:07  [place] [address] Open wound of foot       ED Disposition     ED Disposition Condition Comment    Admit  Case was discussed with Podiatry and the patient's admission status was agreed to be Admission Status: inpatient status to the service of Dr Sherrie Matta  Follow-up Information    None         Patient's Medications   Discharge Prescriptions    No medications on file     No discharge procedures on file      ED Provider  Electronically Signed by           Ryland Novoa MD  07/31/18 0855

## 2018-08-01 ENCOUNTER — ANESTHESIA EVENT (INPATIENT)
Dept: PERIOP | Facility: HOSPITAL | Age: 73
DRG: 623 | End: 2018-08-01
Payer: MEDICARE

## 2018-08-01 PROBLEM — M86.371 CHRONIC MULTIFOCAL OSTEOMYELITIS OF RIGHT FOOT (HCC): Status: ACTIVE | Noted: 2018-07-31

## 2018-08-01 LAB
ALBUMIN SERPL BCP-MCNC: 2.9 G/DL (ref 3.5–5)
ALP SERPL-CCNC: 105 U/L (ref 46–116)
ALT SERPL W P-5'-P-CCNC: 15 U/L (ref 12–78)
ANION GAP SERPL CALCULATED.3IONS-SCNC: 6 MMOL/L (ref 4–13)
AST SERPL W P-5'-P-CCNC: 22 U/L (ref 5–45)
BASOPHILS # BLD AUTO: 0.04 THOUSANDS/ΜL (ref 0–0.1)
BASOPHILS NFR BLD AUTO: 1 % (ref 0–1)
BILIRUB SERPL-MCNC: 1.73 MG/DL (ref 0.2–1)
BUN SERPL-MCNC: 18 MG/DL (ref 5–25)
CALCIUM SERPL-MCNC: 9.1 MG/DL (ref 8.3–10.1)
CHLORIDE SERPL-SCNC: 100 MMOL/L (ref 100–108)
CO2 SERPL-SCNC: 29 MMOL/L (ref 21–32)
CREAT SERPL-MCNC: 1.01 MG/DL (ref 0.6–1.3)
EOSINOPHIL # BLD AUTO: 0.08 THOUSAND/ΜL (ref 0–0.61)
EOSINOPHIL NFR BLD AUTO: 1 % (ref 0–6)
ERYTHROCYTE [DISTWIDTH] IN BLOOD BY AUTOMATED COUNT: 16.2 % (ref 11.6–15.1)
GFR SERPL CREATININE-BSD FRML MDRD: 73 ML/MIN/1.73SQ M
GLUCOSE SERPL-MCNC: 97 MG/DL (ref 65–140)
HCT VFR BLD AUTO: 48.2 % (ref 36.5–49.3)
HGB BLD-MCNC: 16.7 G/DL (ref 12–17)
LYMPHOCYTES # BLD AUTO: 2.32 THOUSANDS/ΜL (ref 0.6–4.47)
LYMPHOCYTES NFR BLD AUTO: 27 % (ref 14–44)
MCH RBC QN AUTO: 35.8 PG (ref 26.8–34.3)
MCHC RBC AUTO-ENTMCNC: 34.6 G/DL (ref 31.4–37.4)
MCV RBC AUTO: 103 FL (ref 82–98)
MONOCYTES # BLD AUTO: 0.49 THOUSAND/ΜL (ref 0.17–1.22)
MONOCYTES NFR BLD AUTO: 6 % (ref 4–12)
NEUTROPHILS # BLD AUTO: 5.6 THOUSANDS/ΜL (ref 1.85–7.62)
NEUTS SEG NFR BLD AUTO: 66 % (ref 43–75)
NRBC BLD AUTO-RTO: 0 /100 WBCS
PLATELET # BLD AUTO: 151 THOUSANDS/UL (ref 149–390)
PMV BLD AUTO: 10.6 FL (ref 8.9–12.7)
POTASSIUM SERPL-SCNC: 3.9 MMOL/L (ref 3.5–5.3)
PROT SERPL-MCNC: 6.9 G/DL (ref 6.4–8.2)
RBC # BLD AUTO: 4.67 MILLION/UL (ref 3.88–5.62)
SODIUM SERPL-SCNC: 135 MMOL/L (ref 136–145)
WBC # BLD AUTO: 8.53 THOUSAND/UL (ref 4.31–10.16)

## 2018-08-01 PROCEDURE — 80053 COMPREHEN METABOLIC PANEL: CPT | Performed by: STUDENT IN AN ORGANIZED HEALTH CARE EDUCATION/TRAINING PROGRAM

## 2018-08-01 PROCEDURE — 99223 1ST HOSP IP/OBS HIGH 75: CPT | Performed by: INTERNAL MEDICINE

## 2018-08-01 PROCEDURE — 85025 COMPLETE CBC W/AUTO DIFF WBC: CPT | Performed by: STUDENT IN AN ORGANIZED HEALTH CARE EDUCATION/TRAINING PROGRAM

## 2018-08-01 PROCEDURE — 99232 SBSQ HOSP IP/OBS MODERATE 35: CPT | Performed by: INTERNAL MEDICINE

## 2018-08-01 RX ORDER — ONDANSETRON 2 MG/ML
4 INJECTION INTRAMUSCULAR; INTRAVENOUS EVERY 6 HOURS PRN
Status: DISCONTINUED | OUTPATIENT
Start: 2018-08-01 | End: 2018-08-07 | Stop reason: HOSPADM

## 2018-08-01 RX ORDER — METOPROLOL SUCCINATE 25 MG/1
25 TABLET, EXTENDED RELEASE ORAL DAILY
Status: DISCONTINUED | OUTPATIENT
Start: 2018-08-01 | End: 2018-08-07 | Stop reason: HOSPADM

## 2018-08-01 RX ORDER — METOPROLOL TARTRATE 5 MG/5ML
5 INJECTION INTRAVENOUS EVERY 4 HOURS PRN
Status: DISCONTINUED | OUTPATIENT
Start: 2018-08-01 | End: 2018-08-07 | Stop reason: HOSPADM

## 2018-08-01 RX ORDER — METOCLOPRAMIDE HYDROCHLORIDE 5 MG/ML
10 INJECTION INTRAMUSCULAR; INTRAVENOUS ONCE
Status: COMPLETED | OUTPATIENT
Start: 2018-08-01 | End: 2018-08-01

## 2018-08-01 RX ORDER — POTASSIUM CHLORIDE 20 MEQ/1
40 TABLET, EXTENDED RELEASE ORAL ONCE
Status: COMPLETED | OUTPATIENT
Start: 2018-08-01 | End: 2018-08-01

## 2018-08-01 RX ADMIN — ONDANSETRON 4 MG: 2 INJECTION INTRAMUSCULAR; INTRAVENOUS at 23:11

## 2018-08-01 RX ADMIN — FUROSEMIDE 40 MG: 40 TABLET ORAL at 08:42

## 2018-08-01 RX ADMIN — METOPROLOL TARTRATE 5 MG: 5 INJECTION, SOLUTION INTRAVENOUS at 15:36

## 2018-08-01 RX ADMIN — COLCHICINE 0.6 MG: 0.6 TABLET, FILM COATED ORAL at 08:42

## 2018-08-01 RX ADMIN — METRONIDAZOLE 500 MG: 500 TABLET ORAL at 21:07

## 2018-08-01 RX ADMIN — DABIGATRAN ETEXILATE MESYLATE 150 MG: 150 CAPSULE ORAL at 08:43

## 2018-08-01 RX ADMIN — LEVOTHYROXINE SODIUM 50 MCG: 50 TABLET ORAL at 05:11

## 2018-08-01 RX ADMIN — GABAPENTIN 300 MG: 300 CAPSULE ORAL at 21:07

## 2018-08-01 RX ADMIN — ONDANSETRON 4 MG: 2 INJECTION INTRAMUSCULAR; INTRAVENOUS at 14:44

## 2018-08-01 RX ADMIN — METOCLOPRAMIDE 10 MG: 5 INJECTION, SOLUTION INTRAMUSCULAR; INTRAVENOUS at 18:54

## 2018-08-01 RX ADMIN — METRONIDAZOLE 500 MG: 500 TABLET ORAL at 05:11

## 2018-08-01 RX ADMIN — ALLOPURINOL 300 MG: 100 TABLET ORAL at 08:43

## 2018-08-01 RX ADMIN — Medication 500 MG: at 08:42

## 2018-08-01 RX ADMIN — POTASSIUM CHLORIDE 20 MEQ: 20 SOLUTION ORAL at 08:42

## 2018-08-01 RX ADMIN — GABAPENTIN 300 MG: 300 CAPSULE ORAL at 08:42

## 2018-08-01 RX ADMIN — METOLAZONE 2.5 MG: 2.5 TABLET ORAL at 08:42

## 2018-08-01 RX ADMIN — EPLERENONE 25 MG: 25 TABLET, FILM COATED ORAL at 08:43

## 2018-08-01 RX ADMIN — ENOXAPARIN SODIUM 40 MG: 40 INJECTION SUBCUTANEOUS at 08:42

## 2018-08-01 RX ADMIN — CEFAZOLIN SODIUM 2000 MG: 2 SOLUTION INTRAVENOUS at 00:11

## 2018-08-01 RX ADMIN — CEFAZOLIN SODIUM 2000 MG: 2 SOLUTION INTRAVENOUS at 08:42

## 2018-08-01 RX ADMIN — POTASSIUM CHLORIDE 40 MEQ: 1500 TABLET, EXTENDED RELEASE ORAL at 12:39

## 2018-08-01 NOTE — CONSULTS
Consultation - Infectious Disease   Zoran Luke 74 y o  male MRN: [id number]  Unit/Bed#: E4 -01 Encounter: [id number]      IMPRESSION & RECOMMENDATIONS:   Impression/Recommendations:  1  Right foot cellulitis  No history of MDR infections  Seems to be improving on current antibiotics  No associated fevers  Patient remains systemically well, nontoxic     - continue with IV cefazolin  - continue with oral metronidazole  - follow up pending blood cultures  - followup superficial wound culture  - serial exams    2  Right foot 5th submetatarsal ulceration with underlying osteomyelitis  MRI suggestive of osteomyelitis of the 5th metatarsal head and suggestion of  associatedseptic arthritis  Podiatry recommending resection  Patient is still deciding if he wants to proceed with surgery  I explained to the patient that   There is a very low probability of complete cure of infection with antibiotics alone  - antibiotic plan as above  - follow-up surgical plans  - close podiatry follow-up ongoing  - continue with local wound care and dressing changes     3  Peripheral vascular disease  Likely contributing to development of #2  Vascular surgery consult is pending  4  Diabetes mellitus type 2 with neuropathy  Also likely contributing to development of severe foot infection  5  Hyperbilirubinemia  Antibiotics:   IV cefazolin/ oral Flagyl #2    Discussed above plan in detail with patient and with Dr Sherrie Matta  Thank you for this consultation  We will follow along with you  HISTORY OF PRESENT ILLNESS:  Reason for Consult: Foot cellulitis and osteomyelitis    HPI: Zoran Luke is a 74 y o  male  With history of diabetes, neuropathy, peripheral vascular disease admitted on 07/31 due to chronic right foot ulceration with surrounding worsening redness, swelling  Patient has been following with Dr Sandee Armas as an outpatient for management of his chronic ulcerations    Yesterday in the office, he was seen by Dr Winsome Lopez and the worsening redness, swelling involving the right ulceration was noted so patient was sent to the hospital for further workup  No obvious drainage  No fevers, chills, nausea, vomiting, diarrhea  Was not on antibiotics as outpatient  Patient was started on IV cefazolin and Flagyl and has noted some improvement in the redness and swelling in the past 24 hours  REVIEW OF SYSTEMS:  A complete system-based review of systems is otherwise negative  PAST MEDICAL HISTORY:  Past Medical History:   Diagnosis Date    Cardiac disease     [facility]congestive [facility] failure) ([facility] )     Coronary artery disease     Diabetes mellitus ([facility] )     Disease of thyroid gland     Fracture, humerus     GERD (gastroesophageal reflux disease)     Hypertension     Prediabetes     Wrist fracture      Past Surgical History:   Procedure Laterality Date    CORONARY ARTERY BYPASS GRAFT         FAMILY HISTORY:  Non-contributory    SOCIAL HISTORY:  History   Alcohol Use    Yes     Comment: "very little"     History   Drug Use No     History   Smoking Status    Former Smoker   Smokeless Tobacco    Never Used       ALLERGIES:  Allergies   Allergen Reactions    Ace Inhibitors     Other      High fructose corn syrup    Statins        MEDICATIONS:  All current active medications have been reviewed      PHYSICAL EXAM:  Vitals:  HR:  [] 98  Resp:  [18] 18  BP: (118-134)/(72-97) 118/74  SpO2:  [94 %-97 %] 97 %  Temp (24hrs), Av 1 °F (37 3 °C), Min:98 4 °F (36 9 °C), Max:99 9 °F (37 7 °C)  Current: Temperature: 99 °F (37 2 °C)     Physical Exam:  General:  Well-nourished, well-developed, in no acute distress  Eyes:  Conjunctive clear with no hemorrhages or effusions  Oropharynx:  No ulcers, no lesions  Neck:  Supple, no lymphadenopathy  Lungs:  Clear to auscultation bilaterally, no accessory muscle use  Cardiac:  Regular rate and rhythm, no murmurs  Abdomen:  Soft, non-tender, non-distended  Extremities:   Bilateral lower extremity venous stasis changes   Right lower extremity edema   right foot 5th metatarsal head ulceration with mild surrounding erythema and necrotic changes  No obvious drainage  Skin:  No rashes, no ulcers  Neurological:  Moves all four extremities spontaneously, sensation grossly intact    LABS, IMAGING, & OTHER STUDIES:  Lab Results:  I have personally reviewed pertinent labs  Results from last 7 days  Lab Units 08/01/18  0445 07/31/18  1434   SODIUM mmol/L 135* 137   POTASSIUM mmol/L 3 9 3 8   CHLORIDE mmol/L 100 98*   CO2 mmol/L 29 32   ANION GAP mmol/L 6 7   BUN mg/dL 18 17   CREATININE mg/dL 1 01 1 12   EGFR ml/min/1 73sq m 73 64   GLUCOSE RANDOM mg/dL 97 91   CALCIUM mg/dL 9 1 9 3   AST U/L 22 26   ALT U/L 15 17   ALK PHOS U/L 105 124*   TOTAL PROTEIN g/dL 6 9 7 8   BILIRUBIN TOTAL mg/dL 1 73* 2 29*       Results from last 7 days  Lab Units 08/01/18  0445 07/31/18  1739 07/31/18  1434   WBC Thousand/uL 8 53  --  9 96   HEMOGLOBIN g/dL 16 7  --  17 8*   PLATELETS Thousands/uL 151 163 161       Imaging Studies:   I have personally reviewed pertinent imaging study reports and images in PACS  MRI of the foot shows ulceration at the 5th submetatarsal region with underlying marrow edema of the head of the 5th metatarsal suggesting osteomyelitis  Small joint effusion at the 5th metatarsal pharyngeal joint  EKG, Pathology, and Other Studies:   I have personally reviewed pertinent reports

## 2018-08-01 NOTE — PROGRESS NOTES
Progress Note - Podiatry  Evelyn Roque 74 y o  male MRN: [id number]  Unit/Bed#: E4 -01 Encounter: [id number]    Assessment/Plan:  3  73 y/o male with R plantar 5th met ulcer Jennings 3 with cellulitis and likely OM 2/2 pressure and neuropathy  2  DTI R dorsal hallux  3  Jennings 1 ulcer distal L 3rd toe 2/2 pressure and neuropathy  4  Afib with Hx CABGx4 2011-c/w pradaxa  5  CHF  6  DM type 2 with neuropathy-diet-controlled, HbA1c: 6 1%  7  Hypothyroidism  8  HTN  9  Hyperbilirubinemia  10  PVD-LLE stent placed roughly 3 years ago  6  History of gout  12  Code status:  Level 1  13  DVT prophylaxis:  Lovenox    -no leukocytosis patient fever 99F  -plan for patient to OR tomorrow 08/02/2018 for right partial 5th ray amputation at 7:30 a m , appreciate medical clearance for the OR tomorrow  -patient NPO at midnight, will hold a m  dose Lovenox  -right foot MRI reads marrow edema in the 11 mm segment of right 5th metatarsal head to suggest osteomyelitis with small joint effusion at the 5th MPJ, normal proximal phalanx of the 5th  -LEADs: R DAVID 1 13, , TOE 51, L:  DAVID 1 3, MET 67, TOE 0:  Will consider vascular surgery consult given toe pressure of 0 on the left and left 3rd toe ulcer  -NWB R foot, b/l surgical shoes  -PT/OT/CM consults placed  -wound cultures and blood cultures are still pending   -patient to continue Ancef/Flagyl however defer to ID for recommendation  -will change dressing later today with attending     Subjective/Objective   Chief Complaint:   Chief Complaint   Patient presents with    Foot Swelling     pt c/o swelling and pain to right foot  thinks it is infected  just came back from a 9 days cruise  also states on the left foot one of the toes does not appear to be healing right from an old wound  Subjective: 74 y o  y/o male was seen and evaluated at bedside  Blood pressure 118/74, pulse 98, temperature 99 °F (37 2 °C), temperature source Temporal, resp   rate 18, height 6' 2" (1 88 m), weight 97 7 kg (215 lb 6 2 oz), SpO2 97 %  ,Body mass index is 27 65 kg/m²  Invasive Devices     Peripheral Intravenous Line            Peripheral IV 07/31/18 Left Antecubital less than 1 day                Physical Exam:   General: Alert, cooperative and no distress  Lungs: Non labored breathing  Heart: Positive S1, S2  Abdomen: Soft, non-tender  Extremity:       Neurovascular status gross motor function at baseline  Dressing left clean dry and intact  Lab, Imaging and other studies:   CBC:   Lab Results   Component Value Date    WBC 8 53 08/01/2018    HGB 16 7 08/01/2018    HCT 48 2 08/01/2018     (H) 08/01/2018     08/01/2018    MCH 35 8 (H) 08/01/2018    MCHC 34 6 08/01/2018    RDW 16 2 (H) 08/01/2018    MPV 10 6 08/01/2018    NRBC 0 08/01/2018       Imaging: I have personally reviewed pertinent films in PACS  EKG, Pathology, and Other Studies: I have personally reviewed pertinent reports    VTE Pharmacologic Prophylaxis: Enoxaparin (Lovenox)

## 2018-08-01 NOTE — PLAN OF CARE
Patient had no complaints this admission of nausea or vomiting  Ate food brought in from [address] and began vomiting  Podiatry contacted  Zofran given  Also has diarrhea  Attributing this to ABT        GASTROINTESTINAL - ADULT     Minimal or absence of nausea and/or vomiting Progressing        SKIN/TISSUE INTEGRITY - ADULT     Incision(s), wounds(s) or drain site(s) healing without S/S of infection Progressing

## 2018-08-01 NOTE — SOCIAL WORK
Met with pt and friend Joel Davis in room to completed assessment and explain role  Pt was not feeling up to answering questions and stated his friend Joel Davis would be able to answer questions  No PCP listed and wrote info link on discharge instruction sheets  Pt lives in [place] with Joel Davis in a 1 story house with 2 steps to enter  Pt was independent with ADLs, amb with no devices and drove a car  Pt is currently NWB on R foot  No DME and pt was not open with any VNA  Pt is a bundle, but not home bound at this time for VNA  Pt has no hx of MH or drugs  Pt drinks alcohol very little  Pt does not have a POA/Living Will and did not want any information   is Tatiana Paz at [phone]  Joel Davis will transport pt home when medical cleared  Pt is a bundle, but was not home bound  Currently pt is NWB on R foot  Unclear what discharge needs pt might have  CM will follow

## 2018-08-01 NOTE — ANESTHESIA PREPROCEDURE EVALUATION
Review of Systems/Medical History  Patient summary reviewed  Chart reviewed      Cardiovascular  Hypertension , CAD , History of CABG, Dysrhythmias , atrial fibrillation, CHF , PVD,   Comment: ECHO 2015 mildly reduced EF and valves ok,  Pulmonary  Smoker ex-smoker  ,        GI/Hepatic    GERD ,   Comment: Pt currently has N/V          Endo/Other  Diabetes , History of thyroid disease , hypothyroidism,      GYN  Negative gynecology ROS          Hematology   Musculoskeletal       Neurology    Diabetic neuropathy,    Psychology   Negative psychology ROS              Physical Exam    Airway    Mallampati score: I  TM Distance: <3 FB  Neck ROM: full     Dental   No notable dental hx     Cardiovascular  Rhythm: regular, Rate: normal,     Pulmonary  Pulmonary exam normal     Other Findings        Anesthesia Plan  ASA Score- 3     Anesthesia Type- general with ASA Monitors  Additional Monitors:   Airway Plan: LMA  Comment: c      Plan Factors- Patient instructed to abstain from smoking on day of procedure  Patient did not smoke on day of surgery  Induction- intravenous  Postoperative Plan- Plan for postoperative opioid use  Informed Consent- Anesthetic plan and risks discussed with patient and legal guardian  I personally reviewed this patient with the CRNA  Discussed and agreed on the Anesthesia Plan with the CRNA  Louisa Hart

## 2018-08-01 NOTE — PLAN OF CARE
Problem: DISCHARGE PLANNING - CARE MANAGEMENT  Goal: Discharge to post-acute care or home with appropriate resources  INTERVENTIONS:  - Conduct assessment to determine patient/family and health care team treatment goals, and need for post-acute services based on payer coverage, community resources, and patient preferences, and barriers to discharge  - Address psychosocial, clinical, and financial barriers to discharge as identified in assessment in conjunction with the patient/family and health care team  - Arrange appropriate level of post-acute services according to patients   needs and preference and payer coverage in collaboration with the physician and health care team  - Communicate with and update the patient/family, physician, and health care team regarding progress on the discharge plan  - Arrange appropriate transportation to post-acute venues  Outcome: Progressing  Pt is a bundle, but was not home bound  Currently pt is NWB on R foot  Unclear what discharge needs pt might have  CM will follow

## 2018-08-01 NOTE — PROGRESS NOTES
Patient continues to vomit green colored emesis  Patient states he's extremely weak  Podiatry contacted again for plan  Podiatry department called back  Nurse made department aware of patient's status  They asked to contact YAMILEX Lou called  Took verbal order with readback for Reglan 10mg IV  Held all oral medications and antibiotics at this time

## 2018-08-01 NOTE — CONSULTS
Inpatient Medical Consultation - [facility] Internal Medicine    Patient Information: Bibi Godwin 74 y o  male MRN: [id number]  Unit/Bed#: E4 -01 Encounter: [id number]  PCP: No primary care provider on file  Date of Admission:  7/31/2018  Date of Consultation: 08/01/18  Requesting Physician: Riddhi Lees MD    Reason For Consultation:   Medical management    Assessment/Plan:    · Right plantar 5th metatarsal ulcer and distal left 3rd toe ulcer  · Plan for MRI to rule out osteomyelitis  · Empiric antibiotics IV cefazolin and oral metronidazole  · Follow-up cultures  · Further management as per podiatry    · CAD status post CABG  · Patient not have any active chest pain, dyspnea or palpitations    · Atrial fibrillation  · Rate controlled on beta-blocker, Pradaxa for anticoagulation, as per Cardiology okay to withhold anticoagulation    · History of CHF-unspecified  · Unclear whether this is systolic or diastolic, patient maintained on Lasix at home, continue    · Diabetes mellitus  · Diet controlled, monitor Accu-Cheks, sliding scale for coverage  · HbA1c 6 1    · Hypothyroidism  · Continue with levothyroxine    · Hypertension  · Continue with metoprolol     · Hyperlipidemia  · Patient states he does not have history of hyperlipidemia and states he is allergic to statin    · History of peripheral vascular disease  · Vascular surgery consulted    Thank you for this interesting consult, will continue to follow along  VTE Prophylaxis: Reason for no pharmacologic prophylaxis Pradaxa  / reason for no mechanical VTE prophylaxis LE wounds     Counseling / Coordination of Care Time: 30 minutes  Greater than 50% of total time spent on patient counseling and coordination of care  Collaboration of Care:  Were Recommendations Directly Discussed with Primary Treatment Team? - Yes     History of Present Illness:    Bibi Godwin is a 74 y o  male who is originally admitted to the Podiatry service on 7/31/2018 due to right foot pain  We are consulted for medical management  Patient has past medical history significant for CAD status post CABG x4 in 2011, atrial fibrillation on Pradaxa, hypertension, hyperlipidemia, diabetes mellitus, chronic congestive heart failure, unspecified  Patient has a right foot ulcer and left 3rd toe ulcer for which patient has been following outpatient without improvement  Patient having worsening pain with difficulty movement and increased redness to his foot  Denies any nausea, vomiting, fever, chills  Review of Systems:    Review of Systems   Constitutional: Negative  HENT: Negative  Eyes: Negative  Respiratory: Negative  Cardiovascular: Negative  Gastrointestinal: Negative  Endocrine: Negative  Genitourinary: Negative  Musculoskeletal: Positive for joint swelling and myalgias  Skin: Positive for wound  Allergic/Immunologic: Negative  Neurological: Negative  Hematological: Negative  Psychiatric/Behavioral: Negative  Past Medical and Surgical History:     Past Medical History:   Diagnosis Date    Cardiac disease     [facility]     Coronary artery disease     Diabetes mellitus ([facility] )     Disease of thyroid gland     Fracture, humerus     GERD (gastroesophageal reflux disease)     Hypertension     Prediabetes     Wrist fracture        Past Surgical History:   Procedure Laterality Date    CORONARY ARTERY BYPASS GRAFT         Meds/Allergies:    all medications and allergies reviewed    Allergies:    Allergies   Allergen Reactions    Ace Inhibitors     Other      High fructose corn syrup    Statins        Social History:     Marital Status: Single    Substance Use History:   History   Alcohol Use    Yes     Comment: "very little"     History   Smoking Status    Former Smoker   Smokeless Tobacco    Never Used     History   Drug Use No       Family History:    non-contributory    Physical Exam:     Vitals: Blood Pressure: 118/74 (08/01/18 0727)  Pulse: 98 (08/01/18 0727)  Temperature: 99 °F (37 2 °C) (08/01/18 0727)  Temp Source: Temporal (08/01/18 0727)  Respirations: 18 (08/01/18 0727)  Height: 6' 2" (188 cm) (07/31/18 1712)  Weight - Scale: 97 7 kg (215 lb 6 2 oz) (07/31/18 1712)  SpO2: 97 % (08/01/18 0727)    Constitutional: Patient is oriented to person, place and time, no acute distress  HEENT:  Normocephalic, atraumatic, EOMI, PERRLA, no scleral icterus, no pallor, moist oral mucosa  Neck:  Supple, no masses, no thyromegaly, no bruits Normal range of motion  Lymph nodes:  No lymphadenopathy  Cardiovascular: Normal S1S2, RRR, No murmurs/rubs/gallops appreciated  Pulmonary: Clear to auscultation bilaterally, No rhonchi/rales/wheezing appreciated  Abdominal: Soft, Bowel sounds present, Non-tender, Non-distended, No rebound/guarding, no hepatomegaly   Musculoskeletal: No tenderness/abnormality   Extremities:  1+ bilateral lower extremity pitting edema Peripheral pulses palpable and equal bilaterally  Neurological: Cranial nerves II-XII grossly intact, sensation intact, otherwise no focal neurological symptoms  Skin:  Right lower extremity in dressing, ulcer to right foot measuring 5 x 2 7 cm        Additional Data:     Lab Results: I have personally reviewed pertinent reports          Results from last 7 days  Lab Units 08/01/18  0445   WBC Thousand/uL 8 53   HEMOGLOBIN g/dL 16 7   HEMATOCRIT % 48 2   PLATELETS Thousands/uL 151   NEUTROS PCT % 66   LYMPHS PCT % 27   MONOS PCT % 6   EOS PCT % 1       Results from last 7 days  Lab Units 08/01/18  0445   SODIUM mmol/L 135*   POTASSIUM mmol/L 3 9   CHLORIDE mmol/L 100   CO2 mmol/L 29   BUN mg/dL 18   CREATININE mg/dL 1 01   CALCIUM mg/dL 9 1   TOTAL PROTEIN g/dL 6 9   BILIRUBIN TOTAL mg/dL 1 73*   ALK PHOS U/L 105   ALT U/L 15   AST U/L 22   GLUCOSE RANDOM mg/dL 97       Results from last 7 days  Lab Units 07/31/18  1544   INR  1 50*       Imaging: I have personally reviewed pertinent reports  Xr Chest Pa & Lateral    Result Date: 7/31/2018  Narrative: CHEST INDICATION:   pre op clearance  COMPARISON:  None EXAM PERFORMED/VIEWS:  XR CHEST PA & LATERAL FINDINGS:  Median sternotomy wires appear intact  Mild cardiomegaly is present  The lungs are clear  No pneumothorax or pleural effusion  Osseous structures appear within normal limits for patient age  Impression: 1  Mild cardiomegaly 2  Clear lungs  Workstation performed: [id number]     Xr Foot 3+ Views Left    Result Date: 7/31/2018  Narrative: LEFT FOOT INDICATION:   Wound  COMPARISON:  None VIEWS:  XR FOOT 3+ VW LEFT FINDINGS: There is no acute fracture or dislocation  Degenerative changes of the 1st MTP joint  No radiographic evidence of osteomyelitis is present  No lytic or blastic lesions seen  Soft tissues are unremarkable  Impression: No radiographic evidence of osteomyelitis  Workstation performed: [id number]     Xr Foot 3+ Views Right    Result Date: 7/31/2018  Narrative: RIGHT FOOT INDICATION:   Wound over lateral aspect of sole of Right foot  COMPARISON:  None VIEWS:  XR FOOT 3+ VW RIGHT FINDINGS: There is no acute fracture or dislocation  No significant degenerative changes  No radiographic evidence of osteomyelitis is present  No lytic or blastic lesions seen  Soft tissues are unremarkable  Impression: No radiographic evidence of osteomyelitis  Workstation performed: [id number]     Mri Foot/forefoot Toest Right Wo Contrast    Result Date: 8/1/2018  Narrative: MRI RIGHT FOOT INDICATION:   r/o om  COMPARISON: Previous the radiograph from July 31, 2018 TECHNIQUE:  MR sequences were obtained of the right foot including the following:  Localizer, sagittal T1/STIR, coronal T1/T2 fat sat, axial T2 fat sat/PD  Images were acquired on a 1 5 Sheila unit   Gadolinium was not used FINDINGS: SUBCUTANEOUS TISSUES: An ulceration is seen in the 5th submetatarsal region BONES:  There is no marrow abnormality noted in the head of the 5th metatarsal in about 11 mm longest segment suggest osteomyelitis  There is joint effusion is also noted at the 5th metatarsophalangeal joint  The 5th proximal phalanx appears unremarkable FIRST MTP JOINT:  Intact  SESAMOID BONES:  Intact  OTHER ARTICULAR SURFACE:  Normal  PLANTAR FASCIA:  Intact  LISFRANC LIGAMENT:  Intact  FOREFOOT TENDONS: Intact  INTERMETATARSAL REGIONS: No bursitis or Fuchs's neuroma  MUSCULATURE: Intact  Impression: Ulceration at the 5th submetatarsal region with underlying marrow edema in the 11 mm long segment of the head of the 5th metatarsal suggest osteomyelitis  Small joint effusion at the level of the 5th metatarsophalangeal joint  Normal 5th proximal phalanx Workstation performed: [id number]     Vas Lower Limb Arterial Duplex, Complete Bilateral    Result Date: 7/31/2018  Narrative:  [facility] REPORT CLINICAL: Indications: Patient presents with an ulcer on his right plantar 5th digit metatarsal and left 3rd digit x 2 weeks  Patient has bilateral foot pain and states that the pain has improved after he started antibiotics  Operative History: 2011 CABG 2015 Left lower extremity calf arterial stent Risk Factors The patient has history of HTN, Diabetes, CHF, biventricular heart failure and CAD  Clinical Right Pressure:  114/ mm Hg, Left Pressure: IV site  FINDINGS:  Segment                Right                Left                                          Waveform   PSV  EDV  Waveform  PSV  EDV  Post  Tibial           Triphasic                                Ant   Tibial            Biphasic             Biphasic            Peroneal               Triphasic            Biphasic            Common Femoral Artery             100   24             98   12  Prox Profunda                      72   18             80    0  Prox SFA                          124   20            146   14  Mid SFA                           104   18            105    0 Dist SFA                          150   20            123    0  Proximal Pop                       90   23             53   10  Distal Pop                         86   21             68    0  Dist Post Tibial                  100   28             95    0  Dist  Ant  Tibial                  43   10             46   11  Dist Peroneal                     114   27             73   10     CONCLUSION: Impression: RIGHT LOWER LIMB: Diffuse atherosclerotic disease throughout the femoropopliteal arteries without evidence of significant focal stenosis  Findings suggestive of tibioperoneal disease  Ankle/Brachial index: 1 13, within normal limits  PVR/ PPG tracings are normal  Metatarsal pressure of 140 mmHg  Great toe pressure of 51 mmHg, borderline within healing range for a diabetic  LEFT LOWER LIMB: Diffuse atherosclerotic disease throughout the femoropopliteal arteries without evidence of significant focal stenosis  Findings suggestive of tibioperoneal disease  Ankle/Brachial index: 1 3, within normal limits  PVR/ PPG tracings are dampened  Metatarsal pressure of 67 mmHg  Great toe pressure of 0 mmHg  There is no prior exam for comparison  SIGNATURE: Electronically Signed by: Declan Daniel MD, RPVI on 2018-07-31 11:12:31 PM    ** Please Note: This note has been constructed using a voice recognition system   **

## 2018-08-01 NOTE — CONSULTS
Consult - Cardiology   Pauline Acosta 74 y o  male MRN: [id number]  Unit/Bed#: E4 -01 Encounter: [id number]        Reason For Consult:  Risk stratification for podiatric surgery               Assessment and Plan:     1  Rt 5th toe ulceration/cellulitis: Resection forthcoming by podiatry   2  Cardiac risk for podiatric surgery:    -Likely not more than intermediate as pt is stable from cardiac perspective with plan for rather low risk procedure  No additional cardiac testing needs to precede surgery  Okay to withhold anticoagulation (see below)  3  PAD with previous LLE angioplasty/stenting  Current LEADS abnormal with incidental notation of Lt great toe pressure 0  Involvement of vascular surgery at discretion of podiatry  4  Atrial fibrillation:     -Patient presents in AFib with slightly accelerated ventricular rate (110s)  Allegedly on no AV blocking Rx or antiarrhythmic prior to admission ~~> will order metoprolol (routine oral and IV p r n ) following heart rate response    -Okay to stop Pradaxa for surgery with out any bridging  Resume anticoagulation when acceptable to Podiatry       History Of Present Illness: This gentleman is a 67-year-old who was referred to the emergency department from the office of his podiatrist with report of pain of his right foot  He has subsequently admitted to the service of Podiatry with cellulitis, 5th toe ulceration, and probable osteomyelitis  Anticipating amutation of his 5th toe, cardiology consultation is requested for operative risk stratification  Mr Zell Pallas has a history of cardiac problems including CAD with CABG x4-2011, atrial fibrillation with Pradaxa anticoagulation, hypertension, dyslipidemia, and chronic CHF-unspecified  This patient is followed by a cardiologist closer to his home  He has not been hospitalized for any cardiac related problems in recent years  He reports having had a stress test 1-2 years ago with favorable results  Echocardiogram, 6/30/2015, at [facility] was technically difficult with report of mild concentric LVH, probable mild reduction in LVEF, with mild biatrial dilatation, dilated IVC, aortic sclerosis, mild MR, and moderate TR  Functionally the patient reports ability to walk at least 100 yd without chest pain or dyspnea  He has had no symptoms of tachyarrhythmia, near-syncope, syncope, orthopnea, PND, or lower extremity edema  Past Medical History:        Past Medical History:   Diagnosis Date    Cardiac disease     [facility]     Coronary artery disease     [facility] ([facility] )     Disease of thyroid gland     Fracture, humerus     GERD (gastroesophageal reflux disease)     Hypertension     Prediabetes     Wrist fracture     Past Surgical History:   Procedure Laterality Date    CORONARY ARTERY BYPASS GRAFT          Allergy:        Allergies   Allergen Reactions    Ace Inhibitors     Other      High fructose corn syrup    Statins        Medications:       Prior to Admission medications    Medication Sig Start Date End Date Taking?  Authorizing Provider   acetaminophen (TYLENOL) 500 mg tablet Take 500 mg by mouth every 6 (six) hours as needed for mild pain   Yes Historical Provider, MD   allopurinol (ZYLOPRIM) 300 mg tablet Take 300 mg by mouth 5/7/18  Yes Historical Provider, MD Alvarez-Oreg Grape-Goldenseal (BERBERINE COMPLEX PO) Take 400 mg by mouth daily   Yes Historical Provider, MD   colchicine (COLCRYS) 0 6 mg tablet Take 0 6 mg by mouth 2 (two) times a day   8/23/17  Yes Historical Provider, MD   dabigatran etexilate (PRADAXA) 150 mg capsu Take 150 mg by mouth 2 (two) times a day   Yes Historical Provider, MD   eplerenone (INSPRA) 25 mg tablet Take 25 mg by mouth daily   Yes Historical Provider, MD   furosemide (LASIX) 40 mg tablet Take 40 mg by mouth daily 2/15/18  Yes Historical Provider, MD   gabapentin (NEURONTIN) 300 mg capsule Take 300 mg by mouth 3 (three) times a day   11/28/17  Yes Historical Provider, MD   levothyroxine 50 mcg tablet Take 50 mcg by mouth daily   Yes Historical Provider, MD   magnesium gluconate (MAGONATE) 500 mg tablet Take 500 mg by mouth daily   Yes Historical Provider, MD   metolazone (ZAROXOLYN) 2 5 mg tablet Take 2 5 mg by mouth 3 (three) times a week   Yes Historical Provider, MD   potassium chloride (MICRO-K) 10 MEQ CR capsule Take 20 mEq by mouth 2 (two) times a day   Yes Historical Provider, MD   Turmeric Curcumin 500 MG CAPS Take 1,000 mg by mouth daily   Yes Historical Provider, MD   Ubiquinol 300 MG CAPS Take 300 mg by mouth daily   Yes Historical Provider, MD       Family History:     History reviewed  No pertinent family history  Social History:       Social History     Social History    Marital status: Single     Spouse name: N/A    Number of children: N/A    Years of education: N/A     Social History Main Topics    Smoking status: Former Smoker    Smokeless tobacco: Never Used    Alcohol use Yes      Comment: "very little"    Drug use: No    Sexual activity: Not Asked     Other Topics Concern    None     Social History Narrative    None       ROS:  Symptoms per HPI  Some dysesthesias of his limbs  Chronic stasis changes of the bilateral lower extremities    Exam:  General:  Pleasant, normally conversant, and comfortable-appearing  Head: Normocephalic, atraumatic  Eyes:  EOMI  Pupils - equal, round, reactive to accomodation  No icterus  Normal Conjunctiva  Oropharynx:  Moist without lesion  Neck:  No gross bruit or JVD  Heart:  Currently regular with slightly increased rate  Soft murmur at left sternal border  Lungs:  Earlean Landsman moderate effort and air exchange with no rales/rhonchi/wheeze  Abdomen:  Soft and nontender with normal bowel sounds  No organomegaly or mass  Lower Limbs:  Some circumferential brawny changes consistent with venous stasis    Trivial edema  Musculoskeletal: Independent movement of limbs observed, Formal ROM and strength eval not performed  Neurologic:    Oriented to: person , place, situation  Cranial Nerves: grossly intact - vision, smell, taste, and hearing  were not tested       Motor function:grossly normal, symmetric   Sensation: Was not tested    DATA:      ECG:                 Atrial fibrillation with ventricular rate approximately 110 beats per minute  Nonspecific ST T wave abnormalities  No prior ECG for comparison

## 2018-08-02 ENCOUNTER — ANESTHESIA (INPATIENT)
Dept: PERIOP | Facility: HOSPITAL | Age: 73
DRG: 623 | End: 2018-08-02
Payer: MEDICARE

## 2018-08-02 ENCOUNTER — APPOINTMENT (INPATIENT)
Dept: RADIOLOGY | Facility: HOSPITAL | Age: 73
DRG: 623 | End: 2018-08-02
Payer: MEDICARE

## 2018-08-02 PROBLEM — I71.4 ABDOMINAL AORTIC ANEURYSM (AAA) WITHOUT RUPTURE (HCC): Status: ACTIVE | Noted: 2018-08-02

## 2018-08-02 PROBLEM — I70.239 ATHEROSCLEROSIS OF NATIVE ARTERY OF RIGHT LOWER EXTREMITY WITH ULCERATION (HCC): Status: ACTIVE | Noted: 2017-08-23

## 2018-08-02 PROBLEM — I70.201 ATHEROSCLEROSIS OF ARTERY OF RIGHT LOWER EXTREMITY (HCC): Status: ACTIVE | Noted: 2017-08-23

## 2018-08-02 PROBLEM — I87.2 VENOUS INSUFFICIENCY (CHRONIC) (PERIPHERAL): Status: ACTIVE | Noted: 2018-08-02

## 2018-08-02 LAB
ALBUMIN SERPL BCP-MCNC: 3.1 G/DL (ref 3.5–5)
ALP SERPL-CCNC: 96 U/L (ref 46–116)
ALT SERPL W P-5'-P-CCNC: 13 U/L (ref 12–78)
ANION GAP SERPL CALCULATED.3IONS-SCNC: 10 MMOL/L (ref 4–13)
AST SERPL W P-5'-P-CCNC: 25 U/L (ref 5–45)
BASOPHILS # BLD AUTO: 0.02 THOUSANDS/ΜL (ref 0–0.1)
BASOPHILS NFR BLD AUTO: 0 % (ref 0–1)
BILIRUB SERPL-MCNC: 1.57 MG/DL (ref 0.2–1)
BUN SERPL-MCNC: 20 MG/DL (ref 5–25)
CALCIUM SERPL-MCNC: 9 MG/DL (ref 8.3–10.1)
CHLORIDE SERPL-SCNC: 99 MMOL/L (ref 100–108)
CO2 SERPL-SCNC: 29 MMOL/L (ref 21–32)
CREAT SERPL-MCNC: 1.18 MG/DL (ref 0.6–1.3)
EOSINOPHIL # BLD AUTO: 0 THOUSAND/ΜL (ref 0–0.61)
EOSINOPHIL NFR BLD AUTO: 0 % (ref 0–6)
ERYTHROCYTE [DISTWIDTH] IN BLOOD BY AUTOMATED COUNT: 16 % (ref 11.6–15.1)
GFR SERPL CREATININE-BSD FRML MDRD: 60 ML/MIN/1.73SQ M
GLUCOSE SERPL-MCNC: 122 MG/DL (ref 65–140)
HCT VFR BLD AUTO: 50.3 % (ref 36.5–49.3)
HGB BLD-MCNC: 17.6 G/DL (ref 12–17)
LYMPHOCYTES # BLD AUTO: 1.79 THOUSANDS/ΜL (ref 0.6–4.47)
LYMPHOCYTES NFR BLD AUTO: 16 % (ref 14–44)
MCH RBC QN AUTO: 36.1 PG (ref 26.8–34.3)
MCHC RBC AUTO-ENTMCNC: 35 G/DL (ref 31.4–37.4)
MCV RBC AUTO: 103 FL (ref 82–98)
MONOCYTES # BLD AUTO: 0.58 THOUSAND/ΜL (ref 0.17–1.22)
MONOCYTES NFR BLD AUTO: 5 % (ref 4–12)
NEUTROPHILS # BLD AUTO: 9.03 THOUSANDS/ΜL (ref 1.85–7.62)
NEUTS SEG NFR BLD AUTO: 79 % (ref 43–75)
NRBC BLD AUTO-RTO: 0 /100 WBCS
PLATELET # BLD AUTO: 170 THOUSANDS/UL (ref 149–390)
PMV BLD AUTO: 10.8 FL (ref 8.9–12.7)
POTASSIUM SERPL-SCNC: 3.8 MMOL/L (ref 3.5–5.3)
PROT SERPL-MCNC: 7.2 G/DL (ref 6.4–8.2)
RBC # BLD AUTO: 4.88 MILLION/UL (ref 3.88–5.62)
SODIUM SERPL-SCNC: 138 MMOL/L (ref 136–145)
WBC # BLD AUTO: 11.42 THOUSAND/UL (ref 4.31–10.16)

## 2018-08-02 PROCEDURE — 73630 X-RAY EXAM OF FOOT: CPT

## 2018-08-02 PROCEDURE — 88304 TISSUE EXAM BY PATHOLOGIST: CPT | Performed by: PATHOLOGY

## 2018-08-02 PROCEDURE — 88311 DECALCIFY TISSUE: CPT | Performed by: PATHOLOGY

## 2018-08-02 PROCEDURE — 85025 COMPLETE CBC W/AUTO DIFF WBC: CPT | Performed by: STUDENT IN AN ORGANIZED HEALTH CARE EDUCATION/TRAINING PROGRAM

## 2018-08-02 PROCEDURE — 99232 SBSQ HOSP IP/OBS MODERATE 35: CPT | Performed by: INTERNAL MEDICINE

## 2018-08-02 PROCEDURE — 99233 SBSQ HOSP IP/OBS HIGH 50: CPT | Performed by: INTERNAL MEDICINE

## 2018-08-02 PROCEDURE — 0QBN0ZZ EXCISION OF RIGHT METATARSAL, OPEN APPROACH: ICD-10-PCS | Performed by: PODIATRIST

## 2018-08-02 PROCEDURE — 80053 COMPREHEN METABOLIC PANEL: CPT | Performed by: STUDENT IN AN ORGANIZED HEALTH CARE EDUCATION/TRAINING PROGRAM

## 2018-08-02 PROCEDURE — 99223 1ST HOSP IP/OBS HIGH 75: CPT | Performed by: NURSE PRACTITIONER

## 2018-08-02 RX ORDER — FENTANYL CITRATE/PF 50 MCG/ML
25 SYRINGE (ML) INJECTION
Status: DISCONTINUED | OUTPATIENT
Start: 2018-08-02 | End: 2018-08-02 | Stop reason: HOSPADM

## 2018-08-02 RX ORDER — OXYCODONE HYDROCHLORIDE AND ACETAMINOPHEN 5; 325 MG/1; MG/1
2 TABLET ORAL EVERY 4 HOURS PRN
Status: DISCONTINUED | OUTPATIENT
Start: 2018-08-02 | End: 2018-08-07 | Stop reason: HOSPADM

## 2018-08-02 RX ORDER — SODIUM CHLORIDE 9 MG/ML
125 INJECTION, SOLUTION INTRAVENOUS CONTINUOUS
Status: DISCONTINUED | OUTPATIENT
Start: 2018-08-02 | End: 2018-08-07 | Stop reason: HOSPADM

## 2018-08-02 RX ORDER — DEXAMETHASONE SODIUM PHOSPHATE 4 MG/ML
INJECTION, SOLUTION INTRA-ARTICULAR; INTRALESIONAL; INTRAMUSCULAR; INTRAVENOUS; SOFT TISSUE AS NEEDED
Status: DISCONTINUED | OUTPATIENT
Start: 2018-08-02 | End: 2018-08-02 | Stop reason: SURG

## 2018-08-02 RX ORDER — ONDANSETRON 2 MG/ML
4 INJECTION INTRAMUSCULAR; INTRAVENOUS ONCE AS NEEDED
Status: DISCONTINUED | OUTPATIENT
Start: 2018-08-02 | End: 2018-08-02 | Stop reason: HOSPADM

## 2018-08-02 RX ORDER — OXYCODONE HYDROCHLORIDE AND ACETAMINOPHEN 5; 325 MG/1; MG/1
1 TABLET ORAL EVERY 4 HOURS PRN
Status: DISCONTINUED | OUTPATIENT
Start: 2018-08-02 | End: 2018-08-07 | Stop reason: HOSPADM

## 2018-08-02 RX ORDER — PROPOFOL 10 MG/ML
INJECTION, EMULSION INTRAVENOUS AS NEEDED
Status: DISCONTINUED | OUTPATIENT
Start: 2018-08-02 | End: 2018-08-02 | Stop reason: SURG

## 2018-08-02 RX ORDER — FENTANYL CITRATE 50 UG/ML
INJECTION, SOLUTION INTRAMUSCULAR; INTRAVENOUS AS NEEDED
Status: DISCONTINUED | OUTPATIENT
Start: 2018-08-02 | End: 2018-08-02 | Stop reason: SURG

## 2018-08-02 RX ORDER — ONDANSETRON 2 MG/ML
INJECTION INTRAMUSCULAR; INTRAVENOUS AS NEEDED
Status: DISCONTINUED | OUTPATIENT
Start: 2018-08-02 | End: 2018-08-02 | Stop reason: SURG

## 2018-08-02 RX ADMIN — ONDANSETRON 4 MG: 2 INJECTION INTRAMUSCULAR; INTRAVENOUS at 23:35

## 2018-08-02 RX ADMIN — ONDANSETRON 4 MG: 2 INJECTION INTRAMUSCULAR; INTRAVENOUS at 08:03

## 2018-08-02 RX ADMIN — GABAPENTIN 300 MG: 300 CAPSULE ORAL at 20:39

## 2018-08-02 RX ADMIN — LIDOCAINE HYDROCHLORIDE 50 MG: 20 INJECTION, SOLUTION INTRAVENOUS at 07:36

## 2018-08-02 RX ADMIN — Medication 3.38 G: at 20:39

## 2018-08-02 RX ADMIN — Medication 3.38 G: at 15:57

## 2018-08-02 RX ADMIN — GABAPENTIN 300 MG: 300 CAPSULE ORAL at 15:26

## 2018-08-02 RX ADMIN — COLCHICINE 0.6 MG: 0.6 TABLET, FILM COATED ORAL at 17:09

## 2018-08-02 RX ADMIN — PROPOFOL 200 MG: 10 INJECTION, EMULSION INTRAVENOUS at 07:36

## 2018-08-02 RX ADMIN — FENTANYL CITRATE 25 MCG: 50 INJECTION, SOLUTION INTRAMUSCULAR; INTRAVENOUS at 07:29

## 2018-08-02 RX ADMIN — METRONIDAZOLE 500 MG: 500 TABLET ORAL at 05:41

## 2018-08-02 RX ADMIN — LEVOTHYROXINE SODIUM 50 MCG: 50 TABLET ORAL at 05:41

## 2018-08-02 RX ADMIN — ONDANSETRON 4 MG: 2 INJECTION INTRAMUSCULAR; INTRAVENOUS at 15:39

## 2018-08-02 RX ADMIN — CEFAZOLIN SODIUM 2000 MG: 2 SOLUTION INTRAVENOUS at 00:49

## 2018-08-02 RX ADMIN — SODIUM CHLORIDE 125 ML/HR: 0.9 INJECTION, SOLUTION INTRAVENOUS at 07:23

## 2018-08-02 RX ADMIN — DEXAMETHASONE SODIUM PHOSPHATE 4 MG: 4 INJECTION, SOLUTION INTRAMUSCULAR; INTRAVENOUS at 08:03

## 2018-08-02 RX ADMIN — OXYCODONE HYDROCHLORIDE AND ACETAMINOPHEN 1 TABLET: 5; 325 TABLET ORAL at 17:11

## 2018-08-02 RX ADMIN — DABIGATRAN ETEXILATE MESYLATE 150 MG: 150 CAPSULE ORAL at 17:09

## 2018-08-02 NOTE — PROGRESS NOTES
[facility] Internal Medicine Progress Note  Patient: Qian Brown 74 y o  male   MRN: [id number]  PCP: No primary care provider on file  Unit/Bed#: E4 -01 Encounter: [id number]  Date Of Visit: 08/02/18    Assessment:    Principal Problem:    Cellulitis of right foot  Active Problems:    Diet-controlled diabetes mellitus ([address] )    Atherosclerosis of native artery of right lower extremity with ulceration (HCC)    Chronic multifocal osteomyelitis of right foot (HCC)    Abdominal aortic aneurysm (AAA) without rupture (HCC)    Venous insufficiency (chronic) (peripheral)      Plan:    · Right plantar 5th metatarsal ulcer and distal left 3rd toe ulcer  ? MRI suggestive of osteomyelitis of 5th metatarsal head and suggestive of associated septic arthritis  ? Infectious Disease consulted, recommending to continue with IV Zosyn  ? Follow-up cultures  ? Further management as per podiatry     · CAD status post CABG  ? Patient not have any active chest pain, dyspnea or palpitations     · Atrial fibrillation  ? Cardiology stated to restart Pradaxa and a m   ? continue with metoprolol for rate control     · History of CHF-unspecified  ? Unclear whether this is systolic or diastolic, patient maintained on Lasix at home, continue     · Diabetes mellitus  ? Diet controlled, monitor Accu-Cheks, sliding scale for coverage  ? HbA1c 6 1  ? Blood sugars well controlled     · Hypothyroidism  ? Continue with levothyroxine     · Hypertension  ? Continue with metoprolol          · Hyperlipidemia  ? Patient states he does not have history of hyperlipidemia and states he is allergic to statin     · History of peripheral vascular disease  ? Vascular surgery consulted  ? Continue with Pradaxa, no further vascular intervention at this time      VTE Pharmacologic Prophylaxis:   Pharmacologic: pradaxa    Patient Centered Rounds: I have performed bedside rounds with nursing staff today  Time Spent for Care: 20 minutes    More than 50% of total time spent on counseling and coordination of care as described above  Current Length of Stay: 2 day(s)    Subjective:   Patient seen and examined at bedside, currently denies any complaints  Objective:     Vitals:   Temp (24hrs), Av 3 °F (36 8 °C), Min:97 4 °F (36 3 °C), Max:99 9 °F (37 7 °C)    HR:  [] 100  Resp:  [16-21] 20  BP: ()/(55-84) 107/82  SpO2:  [91 %-95 %] 95 %  Body mass index is 27 65 kg/m²  Input and Output Summary (last 24 hours): Intake/Output Summary (Last 24 hours) at 18 1737  Last data filed at 18 3713   Gross per 24 hour   Intake              650 ml   Output              425 ml   Net              225 ml       Physical Exam:    Constitutional: Patient is oriented to person, place and time, no acute distress  HEENT:  Normocephalic, atraumatic, EOMI, PERRLA, no scleral icterus, no pallor, moist oral mucosa  Neck:  Supple, no masses, no thyromegaly, no bruits Normal range of motion  Lymph nodes:  No lymphadenopathy  Cardiovascular: Normal S1S2, RRR, No murmurs/rubs/gallops appreciated  Pulmonary: Clear to auscultation bilaterally, No rhonchi/rales/wheezing appreciated  Abdominal: Soft, Bowel sounds present, Non-tender, Non-distended, No rebound/guarding, no hepatomegaly   Musculoskeletal:  Right foot in dressing  Extremities:  No cyanosis, clubbing or edema  Peripheral pulses palpable and equal bilaterally  Neurological: Cranial nerves II-XII grossly intact, sensation intact, otherwise no focal neurological symptoms  Skin: Skin is warm and dry, no rashes      Additional Data:     Labs:      Results from last 7 days  Lab Units 18  0438   WBC Thousand/uL 11 42*   HEMOGLOBIN g/dL 17 6*   HEMATOCRIT % 50 3*   PLATELETS Thousands/uL 170   NEUTROS PCT % 79*   LYMPHS PCT % 16   MONOS PCT % 5   EOS PCT % 0       Results from last 7 days  Lab Units 18  0438   SODIUM mmol/L 138   POTASSIUM mmol/L 3 8   CHLORIDE mmol/L 99*   CO2 mmol/L 29   BUN mg/dL 20   CREATININE mg/dL 1 18   CALCIUM mg/dL 9 0   TOTAL PROTEIN g/dL 7 2   BILIRUBIN TOTAL mg/dL 1 57*   ALK PHOS U/L 96   ALT U/L 13   AST U/L 25   GLUCOSE RANDOM mg/dL 122       Results from last 7 days  Lab Units 07/31/18  1544   INR  1 50*        I Have Reviewed All Lab Data Listed Above  Recent Cultures (last 7 days):       Results from last 7 days  Lab Units 07/31/18  1742 07/31/18  1739 07/31/18  1619   BLOOD CULTURE  No Growth at 24 hrs   No Growth at 24 hrs   --    GRAM STAIN RESULT   --   --  No polys seen  2+ Gram positive cocci in pairs   WOUND CULTURE   --   --  1+ Growth of Oxidase Positive gram negative isabela*  2+ Growth of Enterococcus species*       Last 24 Hours Medication List:     Current Facility-Administered Medications:  acetaminophen 650 mg Oral Q6H PRN Sherree Councilman, DPM    allopurinol 300 mg Oral Daily Sherree Councilman, DPM    colchicine 0 6 mg Oral BID Sherree Councilman, DPM    dabigatran etexilate 150 mg Oral BID Sherree Councilman, DPM    eplerenone 25 mg Oral Daily Sherree Councilman, DPM    furosemide 40 mg Oral Daily Sherree Councilman, DPM    gabapentin 300 mg Oral TID Sherree Councilman, DPM    levothyroxine 50 mcg Oral Early Morning Sherree Councilman, DPM    magnesium gluconate 500 mg Oral Daily Sherree Councilman, DPM    metolazone 2 5 mg Oral Once per day on Mon Wed Fri Anita Rivera, DPM    metoprolol 5 mg Intravenous Q4H PRN Yaritza Castillo PA-C    metoprolol succinate 25 mg Oral Daily Yaritza Castillo PA-C    ondansetron 4 mg Intravenous Q6H PRN Kita Campbell DPM    oxyCODONE-acetaminophen 1 tablet Oral Q4H PRN Lorence Mandes, DPM    oxyCODONE-acetaminophen 2 tablet Oral Q4H PRN Lorence Mandes, DPM    piperacillin-tazobactam 3 375 g Intravenous Q6H Meme Lutz DO Last Rate: 3 375 g (08/02/18 3057)   sodium chloride 125 mL/hr Intravenous Continuous John Lewis DO Last Rate: 125 mL/hr (08/02/18 0723)        Today, Patient Was Seen By: Arline Samuel MD

## 2018-08-02 NOTE — PHYSICAL THERAPY NOTE
Physical Therapy Cancellation Note    PT order received  PT eval deferred as pt in OR today for possible R 5th MT head resection  Please reconsult PT post-op as appropriate w/ updated WBS & activity order   Deb Klein PT

## 2018-08-02 NOTE — OP NOTE
OPERATIVE REPORT  PATIENT NAME: Pauline Acosta    :  1945  MRN: [id number]  Pt Location: [facility] OR ROOM 08    SURGERY DATE: 2018    Surgeon(s) and Role:     * Elaina Peacock MD - Primary     * Maegan Manning DPM - Assisting    Preop Diagnosis:  Chronic multifocal osteomyelitis of right foot ([address] ) Laura Pickard    Post-Op Diagnosis Codes:     * Chronic multifocal osteomyelitis of right foot ([address] ) [M86 371]    Procedure(s) (LRB):  RAY RESECTION FOOT (Right)    Specimen(s):  ID Type Source Tests Collected by Time Destination   1 : right foot 5th metararsa distal fragment / met head Tissue Bone TISSUE Stefanie Hebert MD 2018 [id number]    2 : right 5th mt clean margin Tissue Bone TISSUE EXAM Elaina Peacock MD 2018 [id number]        Estimated Blood Loss:   Minimal    Drains:   none    Anesthesia Type:   Choice with 15 cc local and 1 1 mix of 1% lidocaine plain and 0 5% Marcaine plain    Hemostasis:  Anatomic dissection    Materials:  2-0 vicryl, 3-0 prolene    Operative Indications:  Chronic multifocal osteomyelitis of right foot ([address] ) [U00 046]    Operative Findings:  Removal of 5th metatarsal head with clean margins sent  Remaining 5th metatarsal appeared healthy firm and viable  There is no evidence of purulence, sinus tract, abscess  Complications:   None    Procedure and Technique:  Under mild sedation, the patient was brought into the operating room and placed on the operating room table in the supine position  A time out was performed to confirm the correct patient, procedure and site with all parties in agreement  Following IV sedation, local anesthetic was obtained about the patient's  right foot was performed consisting of 15 ml of 1% Lidocaine and 0 5% Bupivacaine in a 1:1 mixture  The foot was then scrubbed, prepped and draped in the usual aseptic manner       Attention was directed to the dorsal aspect of the 5th metatarsal head where an approximately 3 cm incision was made using sterile 15 blade  Utilizing a sterile 15 blade, this incision was carried deep straight to bone  Soft tissue structures were then reflected off the 5th metatarsal head  A sagittal saw was then utilized to make an oblique cut in the distal right 5th metatarsal   The bone was then passed off to the back table  A small slice of remaining bone was cut and removed using sagittal saw and sent for clean margin  It was noted that all tissue margins were bleeding and viable  No deep sinus tracts or areas of purulence were visualized  The remaining bone on the proximal aspect of the cut was noted to be of hard and viable quality  Any remaining tendinous structures were identified and severed proximally to remove any nidus of infection  The wound was then cleansed with copious amount of sterile saline  Subcutaneous closure was obtained using 2-0 vicryl  Skin closure was then obtained with 3-0 Prolene placed in a horizontal mattress type of fashion  Incision was dressed with Xeroform, sterile 4x4s, Oleg Maki        Patient Disposition:  hemodynamically stable    SIGNATURE: Cristobal Martin DPM  DATE: August 2, 2018  TIME: 8:13 AM

## 2018-08-02 NOTE — PLAN OF CARE
DISCHARGE PLANNING - CARE MANAGEMENT     Discharge to post-acute care or home with appropriate resources Progressing        GASTROINTESTINAL - ADULT     Minimal or absence of nausea and/or vomiting Progressing        SKIN/TISSUE INTEGRITY - ADULT     Incision(s), wounds(s) or drain site(s) healing without S/S of infection Progressing

## 2018-08-02 NOTE — PROGRESS NOTES
Progress Note - Elsi Ríos 74 y o  male MRN: [id number]    Unit/Bed#: E4 -01 Encounter: [id number]  Subjective:   Patient without chest pain or shortness of breath  Is having some involuntary movement of his right foot  Denies palpitations or lightheadedness  No peripheral edema    Objective:   Vitals: Blood pressure 123/78, pulse 99, temperature (!) 97 4 °F (36 3 °C), temperature source Temporal, resp  rate 20, height 6' 2" (1 88 m), weight 97 7 kg (215 lb 6 2 oz), SpO2 95 %  ,Body mass index is 27 65 kg/m²  CBC with diff:   Results from last 7 days  Lab Units 08/02/18  0438   WBC Thousand/uL 11 42*   RBC Million/uL 4 88   HEMOGLOBIN g/dL 17 6*   HEMATOCRIT % 50 3*   MCV fL 103*   MCH pg 36 1*   MCHC g/dL 35 0   RDW % 16 0*   MPV fL 10 8   PLATELETS Thousands/uL 170     CMP:   Results from last 7 days  Lab Units 08/02/18  0438   SODIUM mmol/L 138   POTASSIUM mmol/L 3 8   CHLORIDE mmol/L 99*   CO2 mmol/L 29   ANION GAP mmol/L 10   BUN mg/dL 20   CREATININE mg/dL 1 18   GLUCOSE RANDOM mg/dL 122   CALCIUM mg/dL 9 0   AST U/L 25   ALT U/L 13   ALK PHOS U/L 96   TOTAL PROTEIN g/dL 7 2   BILIRUBIN TOTAL mg/dL 1 57*   EGFR ml/min/1 73sq m 60         Physical exam:  Lungs-fairly clear without wheezing rhonchi rales  Heart-irregular without audible murmur rub or gallop  Abdomen-nontender without mass organomegaly  Ext-no edema      Assessment:  1  afib-? PAF acc to family  Tati Kevin On pradaxa  Held for surgery  2  PAD w/p toe amputation  3  Hx CHF-likely diastolic based on old echo      4  Hx CABG in 2011-no active angina  5   Statin intolerance      Plan:  Continue metoprolol  I would DC on metoprolol XL 25 mg in addition to usual meds (furosemide daily and metolazone 2-3 x per week) as well as pradaxa  His cardiologist in [place] can decide on  Ongoing need for metoprolol  We will see prn here  Please restart pradaxa in AM if ok with surgery      Theron Berg MD

## 2018-08-02 NOTE — PROGRESS NOTES
Progress Note - Infectious Disease   Pietro Lazaro 74 y o  male MRN: [id number]  Unit/Bed#: E4 -01 Encounter: [id number]    Impression/Recommendations:  1  Right foot cellulitis  No history of MDR infections  Seems to be improving on current antibiotics  No associated fevers  Patient remains systemically well, nontoxic  Now wound culture is positive for oxidase positive gram-negative isabela and Enterococcus  Will change antibiotic to cover for these organisms with plan for short postoperative course  Blood cultures negative thus far      -discontinue cefazolin and metronidazole  -start IV Zosyn for now pending culture data  If no ongoing evidence of infection, will plan to stop antibiotics soon  - follow up pending blood cultures  - followup final wound culture  - serial exams     2  Right foot 5th submetatarsal ulceration with underlying osteomyelitis  MRI suggestive of osteomyelitis of the 5th metatarsal head and suggestion of  associated septic arthritis  I explained to the patient that   There is a very low probability of complete cure of infection with antibiotics alone  Patient is now status post removal of 5th metatarsal head  Remaining 5th metatarsal appeared healthy and viable  No purulence noted  Clean margin was sent for pathology      - antibiotic plan as above  - follow-up surgical plans  - close podiatry follow-up ongoing  -followup clean margin pathology to guide final recommendations  - continue with local wound care and dressing changes      3  Peripheral vascular disease  Likely contributing to development of #2  Vascular surgery noted with no plans for intervention at this time       4  Diabetes mellitus type 2 with neuropathy  Also likely contributing to development of severe foot infection       5   Hyperbilirubinemia         Antibiotics:   IV cefazolin/ oral Flagyl #3    Discussed above plan with patient and his girlfriend, and podiatry service        Subjective:  Patient underwent toe resection this morning  He is tolerating lunch without difficulty  Denies fevers, chills, or sweats  Denies nausea, vomiting, or diarrhea  Objective:  Vitals:  HR:  [] 99  Resp:  [16-21] 20  BP: ()/(55-86) 123/78  SpO2:  [91 %-98 %] 95 %  Temp (24hrs), Av 4 °F (36 9 °C), Min:97 4 °F (36 3 °C), Max:99 9 °F (37 7 °C)  Current: Temperature: (!) 97 4 °F (36 3 °C)    Physical Exam:   General:  Well-nourished, well-developed, in no acute distress  Eyes:  Conjunctive clear with no hemorrhages or effusions  Oropharynx:  No ulcers, no lesions  Neck:  Supple, no lymphadenopathy  Lungs:  Clear to auscultation bilaterally, no accessory muscle use  Cardiac:  Regular rate and rhythm, no murmurs  Abdomen:  Soft, non-tender, non-distented  Extremities:  Chronic venous stasis changes  Right foot dressing intact  Skin:  No rashes, no ulcers  Neurological:  Moves all four extremities spontaneously, sensation grossly intact    Lab Results:  I have personally reviewed pertinent labs  Results from last 7 days  Lab Units 18  1434   SODIUM mmol/L 138 135* 137   POTASSIUM mmol/L 3 8 3 9 3 8   CHLORIDE mmol/L 99* 100 98*   CO2 mmol/L 29 29 32   ANION GAP mmol/L 10 6 7   BUN mg/dL 20 18 17   CREATININE mg/dL 1 18 1 01 1 12   EGFR ml/min/1 73sq m 60 73 64   GLUCOSE RANDOM mg/dL 122 97 91   CALCIUM mg/dL 9 0 9 1 9 3   AST U/L 25 22 26   ALT U/L 13 15 17   ALK PHOS U/L 96 105 124*   TOTAL PROTEIN g/dL 7 2 6 9 7 8   BILIRUBIN TOTAL mg/dL 1 57* 1 73* 2 29*       Results from last 7 days  Lab Units 18  0438 18  0445 18  1739 18  1434   WBC Thousand/uL 11 42* 8 53  --  9 96   HEMOGLOBIN g/dL 17 6* 16 7  --  17 8*   PLATELETS Thousands/uL 170 151 163 161       Results from last 7 days  Lab Units 18  1742 18  1739 18  1619   BLOOD CULTURE  No Growth at 24 hrs   No Growth at 24 hrs   --    GRAM STAIN RESULT   --   --  No polys seen 2+ Gram positive cocci in pairs   WOUND CULTURE   --   --  1+ Growth of Oxidase Positive gram negative isabela*  2+ Growth of Enterococcus species*       Imaging Studies:   I have personally reviewed pertinent imaging study reports and images in PACS  EKG, Pathology, and Other Studies:   I have personally reviewed pertinent reports  Operative report reviewed

## 2018-08-02 NOTE — ASSESSMENT & PLAN NOTE
PAD with tissue loss to the right 5th metatarsal and left hallux and 3rd toe eschar  -LEADs 7/31/18: BILATERALLY:  Diffuse atherosclerotic disease throughout the femoropopliteal arteries without evidence of significant focal stenosis  Findings suggestive of tibioperoneal disease  RIGHT DAVID:  1 13/140/51 (borderline healing potential for diabetic)  LEFT DAVID: 1 3/67/0  -Patient reports left leg stent by Dr Nuris Lau for PAD  -Now s/p 5th metatarsal head amputation by Podiatry 8/1/18 with good bleeding at margins   -chronic OM; on ABX per primary team  -Patient on Pradaxa daily;   -documented statin allergy with multiple trials of statins; unable to tolerate  -easily palpable pulse to Left DP/PT and Right PT  Dressing directly over foot to ankle  Doppler signal high on ankle of RIGHT AT/Peroneal/PT  PLAN:   -continue Pradaxa  -monitor wound healing and local wound care per Podiatry  -no vascular interventions at this time  Patient has established care with Dr Nuris Lau  We discussed close monitoring of his wounds to bilateral feet and if anything would change or slow healing, he will seek care with Dr Nuris Lau  -will sign off for now  Thank you for allowing us to participate in the care of Mr Gissell Knott

## 2018-08-02 NOTE — ASSESSMENT & PLAN NOTE
Bilateral venous insufficiency  -chronic skin changes B/L  -wears compression stockings regularly  -elevates when he is able  -continue with conservative management  -follows with Dr Harvey Retana

## 2018-08-02 NOTE — ASSESSMENT & PLAN NOTE
Patient with known AAA per patient  He is followed by Dr Alonzo Chiu and will continue care with his practice

## 2018-08-02 NOTE — OCCUPATIONAL THERAPY NOTE
Occupational Therapy Cancellation Note        Patient Name: Cyndi Meneses  AOHEA'O Date: 8/2/2018    OT consult received  Pt currently in OR for Ray resection of foot  Will follow postop to complete OT evaluation       Kurt Munroe MS, OTR/L

## 2018-08-02 NOTE — PROGRESS NOTES
Progress Note - Podiatry  Roberta Carroll 74 y o  male MRN: [id number]  Unit/Bed#: E4 -01 Encounter: [id number]    Assessment/Plan:  3  75 y/o male with R plantar 5th met ulcer Jennings 3 with cellulitis and OM of 5th met head 2/2 pressure and neuropathy  2  DTI R dorsal hallux  3  Jennings 1 ulcer distal L 3rd toe 2/2 pressure and neuropathy  4  Afib with Hx CABGx4 2011-c/w pradaxa  5  CHF  6  DM type 2 with neuropathy-diet-controlled, HbA1c: 6 1%  7  Hypothyroidism  8  HTN  9  Hyperbilirubinemia  10  PVD-LLE stent placed roughly 3 years ago  6  History of gout  12  Code status:  Level 1  13  DVT prophylaxis:  Lovenox    -pt to OR this am at 730 for R 5th met head resection  -pt confirmed npo, consent in chart  -MRI reads marrow edema R 5th met head to suggest OM  -discussed treatment options with pt and pt is agreeable to R 5th met head resection  -pt to be NWB R foot  -c/w ancef/flagyl  -vasc surg consult pending due to GTP L 0  -cleared by cardiology for surgery; ok to resume pradaxa; B blocker initiated per cardio      Subjective/Objective   Chief Complaint:   Chief Complaint   Patient presents with    Foot Swelling     pt c/o swelling and pain to right foot  thinks it is infected  just came back from a 9 days cruise  also states on the left foot one of the toes does not appear to be healing right from an old wound  Subjective: 74 y o  y/o male was seen and evaluated at bedside  Blood pressure 92/55, pulse 101, temperature 98 2 °F (36 8 °C), temperature source Tympanic, resp  rate 18, height 6' 2" (1 88 m), weight 97 7 kg (215 lb 6 2 oz), SpO2 91 %  ,Body mass index is 27 65 kg/m²  Invasive Devices     Peripheral Intravenous Line            Peripheral IV 07/31/18 Left Antecubital 1 day                Physical Exam:   General: Alert, cooperative and no distress  Lungs: Non labored breathing  Heart: Positive S1, S2  Abdomen: Soft, non-tender    Extremity:       NVS and gross motor function at baseline  Dressing left clean dry intact  Lab, Imaging and other studies:   CBC:   Lab Results   Component Value Date    WBC 11 42 (H) 08/02/2018    HGB 17 6 (H) 08/02/2018    HCT 50 3 (H) 08/02/2018     (H) 08/02/2018     08/02/2018    MCH 36 1 (H) 08/02/2018    MCHC 35 0 08/02/2018    RDW 16 0 (H) 08/02/2018    MPV 10 8 08/02/2018    NRBC 0 08/02/2018       Imaging: I have personally reviewed pertinent films in PACS  EKG, Pathology, and Other Studies: I have personally reviewed pertinent reports    VTE Pharmacologic Prophylaxis: pradaxa

## 2018-08-02 NOTE — CONSULTS
ConsultNoah Never 1945, 74 y o  male MRN: [id number]    Unit/Bed#: E4 -01 Encounter: [id number]    Primary Care Provider: No primary care provider on file  Date and time admitted to hospital: 7/31/2018  1:11 PM      Inpatient consult to Vascular Surgery  Consult performed by: Radha Ambrosio ordered by: Ross Payan          Atherosclerosis of native artery of right lower extremity with ulceration ([facility] [address] )   Assessment & Plan    PAD with tissue loss to the right 5th metatarsal and left hallux and 3rd toe eschar  -LEADs 7/31/18: BILATERALLY:  Diffuse atherosclerotic disease throughout the femoropopliteal arteries without evidence of significant focal stenosis  Findings suggestive of tibioperoneal disease  RIGHT DAVID:  1 13/140/51 (borderline healing potential for diabetic)  LEFT DAVID: 1 3/67/0  -Patient reports left leg stent by Dr Elena Gomez for PAD  -Now s/p 5th metatarsal head amputation by Podiatry 8/1/18 with good bleeding at margins   -chronic OM; on ABX per primary team  -Patient on Pradaxa daily;   -documented statin allergy with multiple trials of statins; unable to tolerate  -easily palpable pulse to Left DP/PT and Right PT  Dressing directly over foot to ankle  Doppler signal high on ankle of RIGHT AT/Peroneal/PT  PLAN:   -continue Pradaxa  -monitor wound healing and local wound care per Podiatry  -no vascular interventions at this time  Patient has established care with Dr Elena Gomez  We discussed close monitoring of his wounds to bilateral feet and if anything would change or slow healing, he will seek care with Dr Elena Gomez  -will sign off for now  Thank you for allowing us to participate in the care of Mr Julissa Pino                    Venous insufficiency (chronic) (peripheral)   Assessment & Plan    Bilateral venous insufficiency  -chronic skin changes B/L  -wears compression stockings regularly  -elevates when he is able  -continue with conservative management  -follows with Dr Jalen Maldonado        Abdominal aortic aneurysm (AAA) without rupture [facility])   Assessment & Plan    Patient with known AAA per patient  He is followed by Dr Jalen Maldonado and will continue care with his practice  Diet-controlled diabetes mellitus ([facility] [address] )   Assessment & Plan    HbA1 6 8  -continue optimal blood sugars for wound healing        * Cellulitis of right foot   Assessment & Plan    On antibiotics per Primary team;   S/p 5th metatarsal amputation 8/1/18  -managed by primary team          Consult Note - Vascular Surgery     Consulting Service: Podiatry    Chief Complaint:  Right metatarsal ulceration and PAD    HPI: Keenan lCay is a 74 y o  male who past medical history of hypertension, CAD status post CABG, peripheral neuropathy, hypothyroid, chronic venous insufficiency, peripheral arterial disease,AAA reported at 4 2 cm presents with cellulitis and osteomyelitis to the right foot  He has peripheral arterial disease which she follows with Dr Jalen Maldonado  He reports about 3 months ago he had a stent to the left leg in the calf region  Patient denies any symptoms of claudication or rest pain  He does report chronic neuropathy to bilateral feet  He is on Pradaxa daily and therefore does not take aspirin at this time  He also trialed multiple statin therapy in the past with reactions on all medications trialed  He is a nonsmoker  He reports his foot wounds have been present for about 2 weeks  Review of Systems:  General: negative  Cardiovascular: no chest pain or dyspnea on exertion  Respiratory: no cough, shortness of breath, or wheezing  Gastrointestinal:  Reports nausea vomiting yesterday  Genitourinary ROS: no dysuria, trouble voiding, or hematuria  Musculoskeletal ROS: negative  Neurological ROS: no TIA or stroke symptoms  Hematological and Lymphatic ROS: negative  Dermatological ROS: positive for Left hallux XR, left 3rd toe eschar/necrotic tissue    Right foot with 5th metatarsal amputation with a dressing intact  Hyperpigmentation to bilateral lower extremities  Psychological ROS: negative  Ophthalmic ROS: negative  ENT ROS: negative    Past Medical History:  Past Medical History:   Diagnosis Date    Cardiac disease     CHF [facility]congestive [facility] failure) ([facility] )     Coronary artery disease     Diabetes mellitus ([facility] )     Disease of thyroid gland     Fracture, humerus     GERD (gastroesophageal reflux disease)     Hypertension     Prediabetes     Wrist fracture        Past Surgical History:  Past Surgical History:   Procedure Laterality Date    CORONARY ARTERY BYPASS GRAFT         Social History:  History   Alcohol Use    Yes     Comment: "very little"     History   Drug Use No     History   Smoking Status    Former Smoker   Smokeless Tobacco    Never Used       Family History:  History reviewed  No pertinent family history  Allergies:   Allergies   Allergen Reactions    Ace Inhibitors     Other      High fructose corn syrup    Statins        Medications:  Current Facility-Administered Medications   Medication Dose Route Frequency    acetaminophen (TYLENOL) tablet 650 mg  650 mg Oral Q6H PRN    allopurinol (ZYLOPRIM) tablet 300 mg  300 mg Oral Daily    ceFAZolin (ANCEF) IVPB (premix) 2,000 mg  2,000 mg Intravenous Q8H    colchicine (COLCRYS) tablet 0 6 mg  0 6 mg Oral BID    dabigatran etexilate (PRADAXA) capsule 150 mg  150 mg Oral BID    eplerenone (INSPRA) tablet 25 mg  25 mg Oral Daily    furosemide (LASIX) tablet 40 mg  40 mg Oral Daily    gabapentin (NEURONTIN) capsule 300 mg  300 mg Oral TID    levothyroxine tablet 50 mcg  50 mcg Oral Early Morning    magnesium gluconate (MAGONATE) tablet 500 mg  500 mg Oral Daily    metolazone (ZAROXOLYN) tablet 2 5 mg  2 5 mg Oral Once per day on Mon Wed Fri    metoprolol (LOPRESSOR) injection 5 mg  5 mg Intravenous Q4H PRN    metoprolol succinate (TOPROL-XL) 24 hr tablet 25 mg  25 mg Oral Daily    metroNIDAZOLE (FLAGYL) tablet 500 mg  500 mg Oral Q8H [address]    ondansetron (ZOFRAN) injection 4 mg  4 mg Intravenous Q6H PRN    sodium chloride 0 9 % infusion  125 mL/hr Intravenous Continuous       Vitals:  Vitals:    08/02/18 0817 08/02/18 0832 08/02/18 0847 08/02/18 1100   BP: 118/74 122/78 128/84 123/78   BP Location:    Right arm   Pulse: 101 102 96 99   Resp: 16 21 20 20   Temp: 98 °F (36 7 °C)  98 °F (36 7 °C) (!) 97 4 °F (36 3 °C)   TempSrc:    Temporal   SpO2: 95% 95% 95% 95%   Weight:       Height:           I/Os:  I/O last 3 completed shifts:  In: -   Out: 751 [Urine:750; Stool:1]  I/O this shift:  In: 650 [I V :650]  Out: -     Lab Results and Cultures:   CBC with diff:   Lab Results   Component Value Date    WBC 11 42 (H) 08/02/2018    HGB 17 6 (H) 08/02/2018    HCT 50 3 (H) 08/02/2018     (H) 08/02/2018     08/02/2018    MCH 36 1 (H) 08/02/2018    MCHC 35 0 08/02/2018    RDW 16 0 (H) 08/02/2018    MPV 10 8 08/02/2018    NRBC 0 08/02/2018   ,   BMP/CMP:  Lab Results   Component Value Date     08/02/2018    K 3 8 08/02/2018    CL 99 (L) 08/02/2018    CO2 29 08/02/2018    ANIONGAP 10 08/02/2018    BUN 20 08/02/2018    CREATININE 1 18 08/02/2018    GLUCOSE 122 08/02/2018    CALCIUM 9 0 08/02/2018    AST 25 08/02/2018    ALT 13 08/02/2018    ALKPHOS 96 08/02/2018    PROT 7 2 08/02/2018    BILITOT 1 57 (H) 08/02/2018    EGFR 60 08/02/2018   ,   Lipid Panel: No results found for: CHOL,   Coags:   Lab Results   Component Value Date    PTT 43 (H) 07/31/2018    INR 1 50 (H) 07/31/2018   ,     Blood Culture:   Lab Results   Component Value Date    BLOODCX No Growth at 24 hrs  07/31/2018   ,   Urinalysis: No results found for: COLORU, CLARITYU, SPECGRAV, PHUR, LEUKOCYTESUR, NITRITE, PROTEINUA, GLUCOSEU, KETONESU, BILIRUBINUR, BLOODU,   Urine Culture: No results found for: URINECX,   Wound Culure:   Lab Results   Component Value Date    WOUNDCULT 1+ Growth of Oxidase Positive gram negative isabela (A) 07/31/2018 WOUNDCULT 2+ Growth of Enterococcus species (A) 2018       Imagin18 LEADs:  Impression:  RIGHT LOWER LIMB:  Diffuse atherosclerotic disease throughout the femoropopliteal arteries without evidence of significant focal stenosis  Findings suggestive of tibioperoneal disease  Ankle/Brachial index: 1 13, within normal limits  PVR/ PPG tracings are normal   Metatarsal pressure of 140 mmHg  Great toe pressure of 51 mmHg, borderline within healing range for a diabetic  LEFT LOWER LIMB:  Diffuse atherosclerotic disease throughout the femoropopliteal arteries without evidence of significant focal stenosis  Findings suggestive of tibioperoneal disease  Ankle/Brachial index: 1 3, within normal limits  PVR/ PPG tracings are dampened  Metatarsal pressure of 67 mmHg  Great toe pressure of 0 mmHg  18 MRI right foot  Ulceration at the 5th submetatarsal region with underlying marrow edema in the 11 mm long segment of the head of the 5th metatarsal suggest osteomyelitis      Small joint effusion at the level of the 5th metatarsophalangeal joint      Normal 5th proximal phalanx    Physical Exam:    General appearance: alert and oriented, in no acute distress and cooperative  Head: Normocephalic, without obvious abnormality, atraumatic  Eyes: PERRL, EOMIs  Neck: no adenopathy, no carotid bruit, no JVD, supple, symmetrical, trachea midline and thyroid not enlarged, symmetric, no tenderness/mass/nodules  Back: symmetric, no curvature  ROM normal  No CVA tenderness  Lungs: clear to auscultation bilaterally  Chest wall: no tenderness  Heart: regular rate and rhythm, S1, S2 normal, no murmur, click, rub or gallop  Abdomen: soft, non-tender; bowel sounds normal; no masses,  no organomegaly  Extremities: Bilateral lower extremities with hyperpigmentation from the knee down to the foot from chronic venous insufficiency    Right foot with dressing intact from operating room with Podiatry yesterday for  metatarsal amputation  Left foot with gauze dressing to 3rd toe it is noted that the right hallux and 3rd toe have eschar/necrotic ulcerations to the distal tips of the toe  Patient has easily palpable pulses to the DP and PT on the left foot the right PT was also easily palpable  Bilateral femoral arteries are easily palpable  Skin: hyperpigmentation - lower leg(s) bilateral  Neurologic: Grossly normal    Wound/Incision:  As noted above    Pulse exam:  Radial: Right: 2+ Left[de-identified] 2+  Femoral: Right: 2+ Left: 2+  Popliteal: Right: non-palpable Left: non-palpable  DP: Right: Dressing intact Left: 2+  PT: Right: 1+ Left: 2+    Doppler signals to RIGHT AT/Peroneal/PT at the ankle above a dressing        Jolanta Valenzuela, [address]  8/2/2018  [facility]  [phone]

## 2018-08-02 NOTE — ANESTHESIA POSTPROCEDURE EVALUATION
Post-Op Assessment Note      CV Status:  Stable    Mental Status:  Alert and awake    Hydration Status:  Euvolemic    PONV Controlled:  Controlled    Airway Patency:  Patent    Post Op Vitals Reviewed:  Yes              /78 (08/02/18 0832)    Temp      Pulse 102 (08/02/18 0832)   Resp 21 (08/02/18 0832)    SpO2 95 % (08/02/18 0832)

## 2018-08-03 LAB
ALBUMIN SERPL BCP-MCNC: 3.4 G/DL (ref 3.5–5)
ALP SERPL-CCNC: 94 U/L (ref 46–116)
ALT SERPL W P-5'-P-CCNC: 17 U/L (ref 12–78)
ANION GAP SERPL CALCULATED.3IONS-SCNC: 11 MMOL/L (ref 4–13)
AST SERPL W P-5'-P-CCNC: 31 U/L (ref 5–45)
BACTERIA WND AEROBE CULT: ABNORMAL
BASOPHILS # BLD AUTO: 0.02 THOUSANDS/ΜL (ref 0–0.1)
BASOPHILS NFR BLD AUTO: 0 % (ref 0–1)
BILIRUB SERPL-MCNC: 2.08 MG/DL (ref 0.2–1)
BUN SERPL-MCNC: 21 MG/DL (ref 5–25)
CALCIUM SERPL-MCNC: 9.4 MG/DL (ref 8.3–10.1)
CHLORIDE SERPL-SCNC: 99 MMOL/L (ref 100–108)
CO2 SERPL-SCNC: 25 MMOL/L (ref 21–32)
CREAT SERPL-MCNC: 1.11 MG/DL (ref 0.6–1.3)
EOSINOPHIL # BLD AUTO: 0 THOUSAND/ΜL (ref 0–0.61)
EOSINOPHIL NFR BLD AUTO: 0 % (ref 0–6)
ERYTHROCYTE [DISTWIDTH] IN BLOOD BY AUTOMATED COUNT: 16.1 % (ref 11.6–15.1)
GFR SERPL CREATININE-BSD FRML MDRD: 65 ML/MIN/1.73SQ M
GLUCOSE SERPL-MCNC: 157 MG/DL (ref 65–140)
GRAM STN SPEC: ABNORMAL
GRAM STN SPEC: ABNORMAL
HCT VFR BLD AUTO: 51.8 % (ref 36.5–49.3)
HGB BLD-MCNC: 17.9 G/DL (ref 12–17)
LYMPHOCYTES # BLD AUTO: 1.45 THOUSANDS/ΜL (ref 0.6–4.47)
LYMPHOCYTES NFR BLD AUTO: 13 % (ref 14–44)
MCH RBC QN AUTO: 35.8 PG (ref 26.8–34.3)
MCHC RBC AUTO-ENTMCNC: 34.6 G/DL (ref 31.4–37.4)
MCV RBC AUTO: 104 FL (ref 82–98)
MONOCYTES # BLD AUTO: 0.52 THOUSAND/ΜL (ref 0.17–1.22)
MONOCYTES NFR BLD AUTO: 5 % (ref 4–12)
NEUTROPHILS # BLD AUTO: 9.64 THOUSANDS/ΜL (ref 1.85–7.62)
NEUTS SEG NFR BLD AUTO: 83 % (ref 43–75)
NRBC BLD AUTO-RTO: 0 /100 WBCS
PLATELET # BLD AUTO: 158 THOUSANDS/UL (ref 149–390)
PMV BLD AUTO: 11.1 FL (ref 8.9–12.7)
POTASSIUM SERPL-SCNC: 3.7 MMOL/L (ref 3.5–5.3)
PROT SERPL-MCNC: 7.8 G/DL (ref 6.4–8.2)
RBC # BLD AUTO: 5 MILLION/UL (ref 3.88–5.62)
SODIUM SERPL-SCNC: 135 MMOL/L (ref 136–145)
WBC # BLD AUTO: 11.63 THOUSAND/UL (ref 4.31–10.16)

## 2018-08-03 PROCEDURE — 80053 COMPREHEN METABOLIC PANEL: CPT | Performed by: STUDENT IN AN ORGANIZED HEALTH CARE EDUCATION/TRAINING PROGRAM

## 2018-08-03 PROCEDURE — G8987 SELF CARE CURRENT STATUS: HCPCS

## 2018-08-03 PROCEDURE — G8988 SELF CARE GOAL STATUS: HCPCS

## 2018-08-03 PROCEDURE — 97167 OT EVAL HIGH COMPLEX 60 MIN: CPT

## 2018-08-03 PROCEDURE — 99232 SBSQ HOSP IP/OBS MODERATE 35: CPT | Performed by: INTERNAL MEDICINE

## 2018-08-03 PROCEDURE — 97163 PT EVAL HIGH COMPLEX 45 MIN: CPT

## 2018-08-03 PROCEDURE — 87493 C DIFF AMPLIFIED PROBE: CPT | Performed by: INTERNAL MEDICINE

## 2018-08-03 PROCEDURE — 85025 COMPLETE CBC W/AUTO DIFF WBC: CPT | Performed by: STUDENT IN AN ORGANIZED HEALTH CARE EDUCATION/TRAINING PROGRAM

## 2018-08-03 RX ORDER — LANOLIN ALCOHOL/MO/W.PET/CERES
3 CREAM (GRAM) TOPICAL
Status: DISCONTINUED | OUTPATIENT
Start: 2018-08-03 | End: 2018-08-07 | Stop reason: HOSPADM

## 2018-08-03 RX ORDER — COLCHICINE 0.6 MG/1
0.6 TABLET ORAL DAILY
Status: DISCONTINUED | OUTPATIENT
Start: 2018-08-04 | End: 2018-08-07 | Stop reason: HOSPADM

## 2018-08-03 RX ADMIN — Medication 3.38 G: at 03:08

## 2018-08-03 RX ADMIN — METOPROLOL SUCCINATE 25 MG: 25 TABLET, EXTENDED RELEASE ORAL at 09:47

## 2018-08-03 RX ADMIN — DABIGATRAN ETEXILATE MESYLATE 150 MG: 150 CAPSULE ORAL at 18:09

## 2018-08-03 RX ADMIN — Medication 3.38 G: at 09:37

## 2018-08-03 RX ADMIN — GABAPENTIN 300 MG: 300 CAPSULE ORAL at 18:09

## 2018-08-03 RX ADMIN — FUROSEMIDE 40 MG: 40 TABLET ORAL at 09:47

## 2018-08-03 RX ADMIN — Medication 3.38 G: at 18:09

## 2018-08-03 RX ADMIN — COLCHICINE 0.6 MG: 0.6 TABLET, FILM COATED ORAL at 09:47

## 2018-08-03 RX ADMIN — LEVOTHYROXINE SODIUM 50 MCG: 50 TABLET ORAL at 05:29

## 2018-08-03 RX ADMIN — Medication 3.38 G: at 21:40

## 2018-08-03 RX ADMIN — DABIGATRAN ETEXILATE MESYLATE 150 MG: 150 CAPSULE ORAL at 09:48

## 2018-08-03 RX ADMIN — ONDANSETRON 4 MG: 2 INJECTION INTRAMUSCULAR; INTRAVENOUS at 21:41

## 2018-08-03 RX ADMIN — ONDANSETRON 4 MG: 2 INJECTION INTRAMUSCULAR; INTRAVENOUS at 09:46

## 2018-08-03 RX ADMIN — Medication 500 MG: at 09:37

## 2018-08-03 RX ADMIN — GABAPENTIN 300 MG: 300 CAPSULE ORAL at 20:31

## 2018-08-03 RX ADMIN — GABAPENTIN 300 MG: 300 CAPSULE ORAL at 09:37

## 2018-08-03 RX ADMIN — MELATONIN TAB 3 MG 3 MG: 3 TAB at 21:41

## 2018-08-03 RX ADMIN — EPLERENONE 25 MG: 25 TABLET, FILM COATED ORAL at 09:48

## 2018-08-03 RX ADMIN — ALLOPURINOL 300 MG: 100 TABLET ORAL at 09:37

## 2018-08-03 NOTE — PLAN OF CARE
Problem: OCCUPATIONAL THERAPY ADULT  Goal: Performs self-care activities at highest level of function for planned discharge setting  See evaluation for individualized goals  Treatment Interventions: ADL retraining, Functional transfer training, UE strengthening/ROM, Endurance training, Cognitive reorientation, Patient/family training, Equipment evaluation/education, Compensatory technique education, Activityengagement, Energy conservation  Equipment Recommended:  (rolling walker)       See flowsheet documentation for full assessment, interventions and recommendations  Limitation: Decreased ADL status, Decreased UE strength, Decreased Safe judgement during ADL, Decreased cognition, Decreased endurance, Decreased self-care trans, Decreased high-level ADLs, Decreased sensation  Prognosis: Good  Assessment: Pt is a 74 y o  male seen for OT evaluation s/p admit to [facility] on 7/31/2018 w/ Cellulitis of right foot, s/p R 5th metatarsal head resection, NWB R LE, w/ B/l surgical shoes  Comorbidities affecting pt's functional performance at time of assessment include: CAD, DM II, HTN, PVD, a-fib w/ h/o CAB in 2011, PVD LLE w/ stent  Personal factors affecting pt at time of IE include:increased pain, nausea and diarrhea  Prior to admission, pt was living w/ significant other and reports independent w/ ADLs, independent w/ functional transfers and mobility w/ no AD, driving, independent w/ IADLs  Upon evaluation: Pt requires MOD-MAX assist LB ADLS, MOD assist toileting 2* the following deficits impacting occupational performance: decreased strength and endurance, impaired balance, NWB R LE, increased pain, impaired insight, poor activity tolerance, increased nausea and diarrhea causing weakness  Pt to benefit from continued skilled OT tx while in the hospital to address deficits as defined above and maximize level of functional independence w ADL's and functional mobility   Pt declined to transfer Out of chair due to weakness and pain, will reassess to complete transfers next session  Occupational Performance areas to address include: grooming, bathing/shower, toilet hygiene, dressing, functional mobility, clothing management, cleaning and meal prep  From OT standpoint, recommendation at time of d/c would be to be determined once further assessment completed        OT Discharge Recommendation: Other (Comment) (to be determined)         Comments: Dulce Feliz MS, OTR/L

## 2018-08-03 NOTE — PLAN OF CARE
DISCHARGE PLANNING - CARE MANAGEMENT     Discharge to post-acute care or home with appropriate resources Not Progressing        GASTROINTESTINAL - ADULT     Minimal or absence of nausea and/or vomiting Not Progressing        SKIN/TISSUE INTEGRITY - ADULT     Incision(s), wounds(s) or drain site(s) healing without S/S of infection Not Progressing

## 2018-08-03 NOTE — PLAN OF CARE
DISCHARGE PLANNING - CARE MANAGEMENT     Discharge to post-acute care or home with appropriate resources Not Progressing        GASTROINTESTINAL - ADULT     Minimal or absence of nausea and/or vomiting Not Progressing    C/O N/ with one episode of vomiting- zofran given and MD updated and aware      Potential for Falls     Patient will remain free of falls Progressing        Prexisting or High Potential for Compromised Skin Integrity     Skin integrity is maintained or improved Progressing        SKIN/TISSUE INTEGRITY - ADULT     Incision(s), wounds(s) or drain site(s) healing without S/S of infection Progressing

## 2018-08-03 NOTE — NURSING NOTE
Update given to S/O with patients approval  Dr Farnaz Aden also rounded on patient and updated S/O  Dr Farnaz Aden and myself answered several questions r/t the N/V/D  Patient was resting and not receptive to talking with nursing or the doctor  Information given to family about isolation precautions

## 2018-08-03 NOTE — PROGRESS NOTES
[facility] Internal [facility] Progress Note  Patient: Levon Lam 74 y o  male   MRN: [id number]  PCP: No primary care provider on file  Unit/Bed#: E4 -01 Encounter: [id number]  Date Of Visit: 08/03/18    Assessment:    Principal Problem:    Cellulitis of right foot  Active Problems:    Diet-controlled diabetes mellitus ([address] )    Atherosclerosis of native artery of right lower extremity with ulceration (HCC)    Chronic multifocal osteomyelitis of right foot (HCC)    Abdominal aortic aneurysm (AAA) without rupture (HCC)    Venous insufficiency (chronic) (peripheral)      Plan:    · Right plantar 5th metatarsal ulcer and distal left 3rd toe ulcer  ? MRI suggestive of osteomyelitis of 5th metatarsal head and suggestive of associated septic arthritis  ? Infectious Disease consulted, recommending to continue with IV Zosyn  ? Follow-up cultures  ? Further management as per podiatry     · CAD status post CABG  ? Patient not have any active chest pain, dyspnea or palpitations     · Atrial fibrillation  ? Restart Pradaxa as per Cardiology recommendation  ? continue with metoprolol for rate control     · History of CHF-unspecified  ? Unclear whether this is systolic or diastolic, patient maintained on Lasix at home, continue     · Diabetes mellitus  ? Diet controlled, monitor Accu-Cheks, sliding scale for coverage  ? HbA1c 6 1  ? Blood sugars well controlled     · Hypothyroidism  ? Continue with levothyroxine     · Hypertension  ? Continue with metoprolol     · Hyperlipidemia  ? Patient states he does not have history of hyperlipidemia and states he is allergic to statin     · History of peripheral vascular disease  ? Vascular surgery consulted  ?  Continue with Pradaxa, no further vascular intervention at this time    Diarrhea   -Patient having multiple episodes of watery loose stools, was recently on antibiotics prior to this admission  -patient also having associated nausea and nonbloody nonbilious vomiting  -will check for C diff  -if negative other etiologies can include due to recent infection, recent surgery and possible colchicine although patient has been on this at home  -will decrease colchicine to daily from b i d   -a patient continues to have abdominal pain will consider CT scan although at this point pain is likely due to persistent diarrhea and nausea    VTE Pharmacologic Prophylaxis:   Pharmacologic: pradaxa    Patient Centered Rounds: I have performed bedside rounds with nursing staff today  Education and Discussions with Family / Patient:  Patient's significant other, at bedside    Time Spent for Care: 20 minutes  More than 50% of total time spent on counseling and coordination of care as described above  Current Length of Stay: 3 day(s)    Code Status: Level 1 - Full Code      Subjective:   Patient seen and examined at bedside, patient currently states he has been having episodes of nausea and nonbloody nonbilious vomiting and watery loose stools  States he sometimes has diffuse abdominal soreness  Currently denies any chest pain, palpitations, dyspnea  Objective:     Vitals:   Temp (24hrs), Av °F (36 7 °C), Min:97 4 °F (36 3 °C), Max:98 9 °F (37 2 °C)    HR:  [] 96  Resp:  [18-20] 18  BP: (107-159)/(72-89) 138/85  SpO2:  [94 %-98 %] 96 %  Body mass index is 27 65 kg/m²  Input and Output Summary (last 24 hours): Intake/Output Summary (Last 24 hours) at 18 1348  Last data filed at 18 0532   Gross per 24 hour   Intake                0 ml   Output              600 ml   Net             -600 ml       Physical Exam:    Constitutional: Patient is oriented to person, place and time, no acute distress  HEENT:  Normocephalic, atraumatic, EOMI, PERRLA, no scleral icterus, no pallor, moist oral mucosa  Neck:  Supple, no masses, no thyromegaly, no bruits Normal range of motion     Lymph nodes:  No lymphadenopathy  Cardiovascular: Normal S1S2, RRR, No murmurs/rubs/gallops appreciated  Pulmonary: Clear to auscultation bilaterally, No rhonchi/rales/wheezing appreciated  Abdominal: Soft, Bowel sounds present, Non-tender, Non-distended, No rebound/guarding, no hepatomegaly   Musculoskeletal:  Right foot in dressing  Extremities:  No cyanosis, clubbing or edema  Peripheral pulses palpable and equal bilaterally  Neurological: Cranial nerves II-XII grossly intact, sensation intact, otherwise no focal neurological symptoms  Skin: Skin is warm and dry, no rashes  Additional Data:     Labs:      Results from last 7 days  Lab Units 08/03/18  0437   WBC Thousand/uL 11 63*   HEMOGLOBIN g/dL 17 9*   HEMATOCRIT % 51 8*   PLATELETS Thousands/uL 158   NEUTROS PCT % 83*   LYMPHS PCT % 13*   MONOS PCT % 5   EOS PCT % 0       Results from last 7 days  Lab Units 08/03/18  0930   SODIUM mmol/L 135*   POTASSIUM mmol/L 3 7   CHLORIDE mmol/L 99*   CO2 mmol/L 25   BUN mg/dL 21   CREATININE mg/dL 1 11   CALCIUM mg/dL 9 4   TOTAL PROTEIN g/dL 7 8   BILIRUBIN TOTAL mg/dL 2 08*   ALK PHOS U/L 94   ALT U/L 17   AST U/L 31   GLUCOSE RANDOM mg/dL 157*       Results from last 7 days  Lab Units 07/31/18  1544   INR  1 50*        I Have Reviewed All Lab Data Listed Above  Recent Cultures (last 7 days):       Results from last 7 days  Lab Units 07/31/18  1742 07/31/18  1739 07/31/18  1619   BLOOD CULTURE  No Growth at 48 hrs   No Growth at 48 hrs   --    GRAM STAIN RESULT   --   --  No polys seen  2+ Gram positive cocci in pairs   WOUND CULTURE   --   --  1+ Growth of Pseudomonas aeruginosa*  2+ Growth of Enterococcus faecalis*  1+ Growth of        Last 24 Hours Medication List:     Current Facility-Administered Medications:  acetaminophen 650 mg Oral Q6H PRN Vi Boop, DPM    allopurinol 300 mg Oral Daily Vi Boop, DPM    colchicine 0 6 mg Oral BID Vi Boop, DPM    dabigatran etexilate 150 mg Oral BID Vi Boop, DPM    eplerenone 25 mg Oral Daily Vi Boop, DPM    furosemide 40 mg Oral Daily Shashank Reddyahams Nicole, DPM    gabapentin 300 mg Oral TID Courtney Randhawa, DPM    levothyroxine 50 mcg Oral Early Morning Courtney Randhawa, DPM    magnesium gluconate 500 mg Oral Daily Courtney Randhawa, DPM    metolazone 2 5 mg Oral Once per day on Mon Wed Fri Anita Nicole, DPM    metoprolol 5 mg Intravenous Q4H PRN Morgan Guardado PA-C    metoprolol succinate 25 mg Oral Daily Morgan Guardado PA-C    ondansetron 4 mg Intravenous Q6H PRN Ariane Briggs, DPM    oxyCODONE-acetaminophen 1 tablet Oral Q4H PRN Ky Ege, DPM    oxyCODONE-acetaminophen 2 tablet Oral Q4H PRN Ky Ege, DPM    piperacillin-tazobactam 3 375 g Intravenous Q6H Meme Lutz, DO Last Rate: 3 375 g (08/03/18 0937)   sodium chloride 125 mL/hr Intravenous Continuous Rhiannon Marshall, DO Last Rate: 125 mL/hr (08/02/18 0723)        Today, Patient Was Seen By: Bob Ruiz MD

## 2018-08-03 NOTE — PHYSICAL THERAPY NOTE
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 ([place] [address] )    Essential hypertension    Cellulitis of right foot    Chronic multifocal osteomyelitis of right foot (HCC)    Abdominal aortic aneurysm (AAA) without rupture ([place] [address] )    Venous insufficiency (chronic) (peripheral)       Admitting Diagnoses:   Cellulitis and abscess of foot [L03 119, L02 619]  Open wound of foot [S91 309A]  Visit for wound check [Z51 89]  Coronary artery disease involving native heart with angina pectoris, unspecified vessel or lesion type ([place] [address] ) [I25 119]  Congestive heart failure, unspecified HF chronicity, unspecified heart failure type ([place] [address] ) [I50 9]    Past Medical History:   Diagnosis Date    Cardiac disease     CHF (congestive heart failure) ([place] [address] )     Coronary artery disease     Diabetes mellitus ([place] [address] )     Disease of thyroid gland     Fracture, humerus     GERD (gastroesophageal reflux disease)     Hypertension     Prediabetes     Wrist fracture        Past Surgical History:   Procedure Laterality Date    CORONARY ARTERY BYPASS GRAFT      AR AMPUTATION METATARSAL+TOE,SINGLE Right 8/2/2018    Procedure: RAY RESECTION FOOT;  Surgeon: Manuel Graham MD;  Location: [facility];  Service: Podiatry        08/03/18 1026   Note Type   Note type Eval only   Pain Assessment   Pain Score 4   Pain Type Acute pain;Surgical pain   Pain Location Foot   Pain Orientation Right   Home Living   Type of [address] One level;Stairs to enter without rails  (2STE w/o railings)   Prior Function   Level of [place] Independent with ADLs and functional mobility  (w/o AD)   Lives With Significant other   Receives Help From Family   ADL Assistance Independent   Falls in the last 6 months 0   Comments (+) driving   Restrictions/Precautions   Weight Bearing Precautions Per Order Yes   RLE Weight Bearing Per Order NWB   Braces or Orthoses Other (Comment)  (B/L surgical shoes)   Other Precautions WBS; Multiple lines;Telemetry; Fall Risk;Pain   General   Family/Caregiver Present No   Cognition   Overall Cognitive Status WFL   Arousal/Participation Alert   Orientation Level Oriented X4   Following Commands Follows one step commands without difficulty   RUE Assessment   RUE Assessment (refer to OT)   LUE Assessment   LUE Assessment (refer to OT)   RLE Assessment   RLE Assessment WFL   Strength RLE   RLE Overall Strength 4/5  (except R ankle 3-/5)   LLE Assessment   LLE Assessment WFL   Strength LLE   LLE Overall Strength 4/5   Coordination   Movements are Fluid and Coordinated 1   Sensation X  (neuropathy)   Bed Mobility   Supine to Sit Unable to assess   Additional Comments pt OOB in recliner pre & post session; pt refused to get out of chair for PT eval 2* to fatigue   Transfers   Sit to Stand Unable to assess   Additional Comments pt refused to get out of chair at this time 2* to inc fatigue + nausea & diarrhea   Ambulation/Elevation   Gait pattern Not tested  (pt refused to get out of chair at this time 2* to inc fatigu)   Endurance Deficit   Endurance Deficit Yes   Endurance Deficit Description deconditioning, pain, weakness   Activity Tolerance   Activity Tolerance Patient limited by fatigue;Patient limited by pain; Other (Comment)  (weakness)   Nurse Made Aware Ayo Lentz   Assessment   Prognosis Fair   Problem List Decreased strength;Decreased endurance; Impaired balance;Decreased mobility;Pain;Orthopedic restrictions;Decreased skin integrity; Impaired sensation   Assessment Pt  72 y o male admitted for R foot cellulitis   S/p R 5th metatarsal head resection on 8/2/18 2* to osteomyelitis  Pt referred to PT for mobility assessment & D/C planning w/ orders of NWB to R foot w/ B/L surgical shoes; activity orders pending, activity order requested, Podiatry to follow    PTA, pt reports being I w/o AD  During visit, PT eval limited to pt's dec participation 2* to pain, nausea & diarrhea (generally not feeling well per pt)  Pt OOB in recliner upon my arrival but refused to get out of chair 2* to above complaints  However, pt agreeable to chair level assessment & surgical shoes fitting/application after encouragement  BLE ROM WFL  BLE MMT 4/5 grossly except R ankle 3-/5  B/L surgical shoes issued & applied  Nsg staff most recent vital signs as follows: /83 (BP Location: Right arm)   Pulse 96   Temp 97 6 °F (36 4 °C) (Temporal)   Resp 18   Ht 6' 2" (1 88 m)   Wt 97 7 kg (215 lb 6 2 oz)   SpO2 96%   BMI 27 65 kg/m²   At end of session, pt remain OOB in chair w/o issues, call bell & phone in reach  Will continue skilled PT to improve function & safety  PT to see next tx session to complete mobility assessment & determine D/C rec  CM to follow  Nsg staff to continue to mobilized pt as tolerated to prevent further decline in function  Nsg notified  Barriers to Discharge Other (Comment)   Barriers to Discharge Comments WBS & AJIT   Goals   Patient Goals to feel better   STG Expiration Date 08/10/18   Short Term Goal #1 Goals to be met in 7 days; pt will be able to: 1) inc strength by 1/2 grade to improve overall functional mobility & dec fall risk; 2) PT to complete overall mobility assessment & set appropriate goals; 3) inc barthel score to 60 to decrease overall risk for falls; 5) pt/caregiver ed   Treatment Day 0   Plan   Treatment/Interventions Continued evaluation;LE strengthening/ROM; Therapeutic exercise;Patient/family training;Spoke to nursing;OT   PT Frequency Other (Comment)  (4-5x/wk)   Recommendation   Recommendation Defer at this time  (pending further mobility assessment) PT - OK to Discharge No   Barthel Index   Feeding 10   Bathing 0   Grooming Score 5   Dressing Score 5   Bladder Score 10   Bowels Score 5   Toilet Use Score 5   Transfers (Bed/Chair) Score 0   Mobility (Level Surface) Score 0   Stairs Score 0   Barthel Index Score 40   Hx/personal factors: co-morbidities, advanced age, mutliple lines, telemetry, use of AD, pain and WB restrictions  Examination: dec mobility, dec balance, dec endurance, dec amb, high fall risk, pain, WB restrictions, weakness  Clinical: unpredictable (ongoing medical status, abnormal lab values, high fall risk and pain mgt)  Complexity: high     Velinda Bullocks, PT

## 2018-08-03 NOTE — OCCUPATIONAL THERAPY NOTE
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 ([facility]    Essential hypertension    Cellulitis of right foot    Chronic multifocal osteomyelitis of right foot ([facility]    Abdominal aortic aneurysm (AAA) without rupture ([facility] )    Venous insufficiency (chronic) (peripheral)     Past Medical History  Past Medical History:   Diagnosis Date    Cardiac disease     CHF (congestive heart failure) ([facility] )     Coronary artery disease     Diabetes mellitus ([facility]     Disease of thyroid gland     Fracture, humerus     GERD (gastroesophageal reflux disease)     Hypertension     Prediabetes     Wrist fracture      Past Surgical History  Past Surgical History:   Procedure Laterality Date    CORONARY ARTERY BYPASS GRAFT      WI AMPUTATION METATARSAL+TOE,SINGLE Right 8/2/2018    Procedure: RAY RESECTION FOOT;  Surgeon: Faith Nascimento MD;  Location: [facility];  Service: Podiatry           08/03/18 1027   Note Type   Note type Eval/Treat   Restrictions/Precautions   Weight Bearing Precautions Per Order Yes   RLE Weight Bearing Per Order NWB   Braces or Orthoses (b/l surgical shoes)   Other Precautions Fall Risk;Pain;Multiple lines;WBS; Contact/isolation; tigertext await activity orders   Pain Assessment   Pain Assessment 0-10   Pain Score 4   Pain Type Chronic pain;Acute pain;Surgical pain   Pain Location Back; Foot   Pain Orientation Bilateral   Hospital Pain Intervention(s) Repositioned   Response to Interventions tolerated   Home Living   Type of [address] [facility] One level;Stairs to enter without rails  (2 AJIT) Bathroom Shower/Tub Tub/shower unit   Bathroom Toilet Standard   Bathroom Equipment Grab bars in shower;Commode   Bathroom Accessibility Accessible   Home Equipment (no DME)   Additional Comments pt driving PTA and girlfriend also drives   Prior Function   Level of [place] Independent with ADLs and functional mobility   Lives With Significant other   [place] [address] Help From Family   ADL Assistance Independent   IADLs Independent   Falls in the last 6 months 0   Vocational Retired   Comments pt reports has 2 BSCs at home; pt active PTA and just returned home from a cruise, girlfriend is available to help him   Lifestyle   Autonomy per pt independent w/ ADLs, idependent w/ IADLs, independent w/ functional transfers and mobility w/ no AD, driving   Reciprocal Relationships significant other   Service to Others retired from the [employer] going out   ADL   Where [address] 5  [address]  [address] [place] [address] [address] 3  Moderate Assistance   [address]  [address]  [address]  3  Mod[address] 3  Moderate Assistance   Bed Mobility   Additional Comments unable to assess   Transfers   Sit to Stand Unable to assess   Additional Comments pt refused to transfer from chair at this time   Balance   Static Sitting Fair +   Activity Tolerance   Activity Tolerance Patient limited by fatigue;Patient limited by pain;Treatment limited secondary to medical complications (Comment)   Nurse Made Aware appropriate to see per Rk NEGRETE Assessment   RUE Assessment WFL  (4/5)   LUE Assessment   LUE Assessment WFL  (recent fx, limited shoulder elevation)   Hand Function   Gross Motor Coordination Functional   Sensation   Light Touch Partial deficits in the RUE;Partial deficits in the LUE;Severe deficits in the LUE;Severe deficits in the RLE  (neuropathy)   Vision-Basic Assessment   Current Vision No visual deficits   Vision - Complex Assessment   Ocular Range of Motion [facility]   Acuity Able to read clock/calendar on wall without difficulty   Perception   Inattention/Neglect Appears intact   Cognition   Overall Cognitive Status [facility]   Arousal/Participation Alert; Cooperative   Attention Within functional limits   Orientation Level Oriented X4   Memory Decreased recall of precautions   Following Commands Follows one step commands without difficulty   Comments pt w/ decreased insight into benefit of moving, pain and diarhea limiting patient   Assessment   Limitation Decreased ADL status; Decreased UE strength;Decreased Safe judgement during ADL;Decreased cognition;Decreased endurance;Decreased self-care trans;Decreased high-level ADLs; Decreased sensation   Prognosis Good   Assessment Pt is a 74 y o  male seen for OT evaluation s/p admit to [facility] on 7/31/2018 w/ Cellulitis of right foot, s/p R 5th metatarsal head resection, NWB R LE, w/ B/l surgical shoes  Comorbidities affecting pt's functional performance at time of assessment include: CAD, DM II, HTN, PVD, a-fib w/ h/o CAB in 2011, PVD LLE w/ stent  Personal factors affecting pt at time of IE include:increased pain, nausea and diarrhea  Prior to admission, pt was living w/ significant other and reports independent w/ ADLs, independent w/ functional transfers and mobility w/ no AD, driving, independent w/ IADLs  Upon evaluation: Pt requires MOD-MAX assist LB ADLS, MOD assist toileting 2* the following deficits impacting occupational performance: decreased strength and endurance, impaired balance, NWB R LE, increased pain, impaired insight, poor activity tolerance, increased nausea and diarrhea causing weakness   Pt to benefit from continued skilled OT tx while in the hospital to address deficits as defined above and maximize level of functional independence w ADL's and functional mobility  Pt declined to transfer Out of chair due to weakness and pain, will reassess to complete transfers next session  Occupational Performance areas to address include: grooming, bathing/shower, toilet hygiene, dressing, functional mobility, clothing management, cleaning and meal prep  From OT standpoint, recommendation at time of d/c would be to be determined once further assessment completed  Goals   Patient Goals "to feel better"   LTG Time Frame 10-14   Long Term Goal please see below goals   Plan   Treatment Interventions ADL retraining;Functional transfer training;UE strengthening/ROM; Endurance training;Cognitive reorientation;Patient/family training;Equipment evaluation/education; Compensatory technique education; Activityengagement; Energy conservation   Goal Expiration Date 08/17/18   OT Frequency 3-5x/wk   Recommendation   OT Discharge Recommendation Other (Comment)  (to be determined)   Equipment Recommended (rolling walker)   Barthel Index   Feeding 10   Bathing 0   Grooming Score 5   Dressing Score 5   Bladder Score 10   Bowels Score 5   Toilet Use Score 5   Transfers (Bed/Chair) Score 0   Mobility (Level Surface) Score 0   Stairs Score 0   Barthel Index Score 40   Modified [place] Scale   Modified [place] Scale 4     Occupational Therapy Goals to be met in 10-14 days:  1) Pt will improve activity tolerance to G for min 30 min txment sessions  2) Pt will complete ADLs/self care w/ mod I   3) Pt will complete toileting w/ mod I w/ G hygiene/thoroughness using DME PRN  4) Pt will engage in ongoing cognitive assessment w/ G participation to A w/ safe d/c planning/recommendations  5) Pt will demonstrate G carryover of pt/caregiver education and training as appropriate w/ mod I  w/ G tolerance  6) Pt will demonstrate improved b/l UE strength by 1 MMT grade to enhance ADLS and functional transfers  7) OTR to assess functional transfers and mobility to enhance safety to return home      Documentation completed by: Chiqui Rivera MS, OTR/L

## 2018-08-03 NOTE — PLAN OF CARE
Problem: PHYSICAL THERAPY ADULT  Goal: Performs mobility at highest level of function for planned discharge setting  See evaluation for individualized goals  Treatment/Interventions: Continued evaluation, LE strengthening/ROM, Therapeutic exercise, Patient/family training, Spoke to nursing, OT          See flowsheet documentation for full assessment, interventions and recommendations  Outcome: Progressing  Prognosis: Fair  Problem List: Decreased strength, Decreased endurance, Impaired balance, Decreased mobility, Pain, Orthopedic restrictions, Decreased skin integrity, Impaired sensation  Assessment: Pt  72 y o male admitted for R foot cellulitis  S/p R 5th metatarsal head resection on 8/2/18 2* to osteomyelitis  Pt referred to PT for mobility assessment & D/C planning w/ orders of NWB to R foot w/ B/L surgical shoes; activity orders pending, activity order requested, Podiatry to follow    PTA, pt reports being I w/o AD  During visit, PT eval limited to pt's dec participation 2* to pain, nausea & diarrhea (generally not feeling well per pt)  Pt OOB in recliner upon my arrival but refused to get out of chair 2* to above complaints  However, pt agreeable to chair level assessment & surgical shoes fitting/application after encouragement  BLE ROM WFL  BLE MMT 4/5 grossly except R ankle 3-/5  B/L surgical shoes issued & applied  Nsg staff most recent vital signs as follows: /83 (BP Location: Right arm)   Pulse 96   Temp 97 6 °F (36 4 °C) (Temporal)   Resp 18   Ht 6' 2" (1 88 m)   Wt 97 7 kg (215 lb 6 2 oz)   SpO2 96%   BMI 27 65 kg/m²   At end of session, pt remain OOB in chair w/o issues, call bell & phone in reach  Will continue skilled PT to improve function & safety  PT to see next tx session to complete mobility assessment & determine D/C rec  CM to follow  Nsg staff to continue to mobilized pt as tolerated to prevent further decline in function  Nsg notified  Barriers to Discharge:  Other (Comment)  Barriers to Discharge Comments: WBS & AJIT  Recommendation: Defer at this time (pending further mobility assessment)     PT - OK to Discharge: No    See flowsheet documentation for full assessment

## 2018-08-04 ENCOUNTER — APPOINTMENT (INPATIENT)
Dept: ULTRASOUND IMAGING | Facility: HOSPITAL | Age: 73
DRG: 623 | End: 2018-08-04
Payer: MEDICARE

## 2018-08-04 LAB
ANION GAP SERPL CALCULATED.3IONS-SCNC: 11 MMOL/L (ref 4–13)
BACTERIA UR QL AUTO: ABNORMAL /HPF
BASOPHILS # BLD AUTO: 0.01 THOUSANDS/ΜL (ref 0–0.1)
BASOPHILS NFR BLD AUTO: 0 % (ref 0–1)
BILIRUB UR QL STRIP: ABNORMAL
BUN SERPL-MCNC: 22 MG/DL (ref 5–25)
C DIFF TOX GENS STL QL NAA+PROBE: NORMAL
CALCIUM SERPL-MCNC: 9.1 MG/DL (ref 8.3–10.1)
CHLORIDE SERPL-SCNC: 99 MMOL/L (ref 100–108)
CLARITY UR: ABNORMAL
CO2 SERPL-SCNC: 27 MMOL/L (ref 21–32)
COLOR UR: ABNORMAL
CREAT SERPL-MCNC: 1.06 MG/DL (ref 0.6–1.3)
EOSINOPHIL # BLD AUTO: 0.01 THOUSAND/ΜL (ref 0–0.61)
EOSINOPHIL NFR BLD AUTO: 0 % (ref 0–6)
ERYTHROCYTE [DISTWIDTH] IN BLOOD BY AUTOMATED COUNT: 16 % (ref 11.6–15.1)
GFR SERPL CREATININE-BSD FRML MDRD: 69 ML/MIN/1.73SQ M
GLUCOSE SERPL-MCNC: 144 MG/DL (ref 65–140)
GLUCOSE UR STRIP-MCNC: NEGATIVE MG/DL
HCT VFR BLD AUTO: 50.6 % (ref 36.5–49.3)
HGB BLD-MCNC: 17.5 G/DL (ref 12–17)
HGB UR QL STRIP.AUTO: ABNORMAL
KETONES UR STRIP-MCNC: ABNORMAL MG/DL
LEUKOCYTE ESTERASE UR QL STRIP: NEGATIVE
LYMPHOCYTES # BLD AUTO: 1.48 THOUSANDS/ΜL (ref 0.6–4.47)
LYMPHOCYTES NFR BLD AUTO: 14 % (ref 14–44)
MCH RBC QN AUTO: 35.8 PG (ref 26.8–34.3)
MCHC RBC AUTO-ENTMCNC: 34.6 G/DL (ref 31.4–37.4)
MCV RBC AUTO: 104 FL (ref 82–98)
MONOCYTES # BLD AUTO: 0.65 THOUSAND/ΜL (ref 0.17–1.22)
MONOCYTES NFR BLD AUTO: 6 % (ref 4–12)
NEUTROPHILS # BLD AUTO: 8.78 THOUSANDS/ΜL (ref 1.85–7.62)
NEUTS SEG NFR BLD AUTO: 80 % (ref 43–75)
NITRITE UR QL STRIP: NEGATIVE
NON-SQ EPI CELLS URNS QL MICRO: ABNORMAL /HPF
NRBC BLD AUTO-RTO: 0 /100 WBCS
PH UR STRIP.AUTO: 6 [PH] (ref 4.5–8)
PLATELET # BLD AUTO: 157 THOUSANDS/UL (ref 149–390)
PMV BLD AUTO: 10.6 FL (ref 8.9–12.7)
POTASSIUM SERPL-SCNC: 3.6 MMOL/L (ref 3.5–5.3)
PROT UR STRIP-MCNC: ABNORMAL MG/DL
RBC # BLD AUTO: 4.89 MILLION/UL (ref 3.88–5.62)
RBC #/AREA URNS AUTO: ABNORMAL /HPF
SODIUM SERPL-SCNC: 137 MMOL/L (ref 136–145)
SP GR UR STRIP.AUTO: 1.02 (ref 1–1.03)
UROBILINOGEN UR QL STRIP.AUTO: 0.2 E.U./DL
WBC # BLD AUTO: 10.93 THOUSAND/UL (ref 4.31–10.16)
WBC #/AREA URNS AUTO: ABNORMAL /HPF

## 2018-08-04 PROCEDURE — 99232 SBSQ HOSP IP/OBS MODERATE 35: CPT | Performed by: INTERNAL MEDICINE

## 2018-08-04 PROCEDURE — 80048 BASIC METABOLIC PNL TOTAL CA: CPT | Performed by: INTERNAL MEDICINE

## 2018-08-04 PROCEDURE — 97760 ORTHOTIC MGMT&TRAING 1ST ENC: CPT

## 2018-08-04 PROCEDURE — 81001 URINALYSIS AUTO W/SCOPE: CPT | Performed by: STUDENT IN AN ORGANIZED HEALTH CARE EDUCATION/TRAINING PROGRAM

## 2018-08-04 PROCEDURE — 76770 US EXAM ABDO BACK WALL COMP: CPT

## 2018-08-04 PROCEDURE — 85025 COMPLETE CBC W/AUTO DIFF WBC: CPT | Performed by: INTERNAL MEDICINE

## 2018-08-04 RX ADMIN — DABIGATRAN ETEXILATE MESYLATE 150 MG: 150 CAPSULE ORAL at 08:59

## 2018-08-04 RX ADMIN — Medication 3.38 G: at 08:59

## 2018-08-04 RX ADMIN — ALLOPURINOL 300 MG: 100 TABLET ORAL at 08:59

## 2018-08-04 RX ADMIN — EPLERENONE 25 MG: 25 TABLET, FILM COATED ORAL at 08:57

## 2018-08-04 RX ADMIN — Medication 3.38 G: at 16:58

## 2018-08-04 RX ADMIN — MELATONIN TAB 3 MG 3 MG: 3 TAB at 21:29

## 2018-08-04 RX ADMIN — GABAPENTIN 300 MG: 300 CAPSULE ORAL at 09:00

## 2018-08-04 RX ADMIN — Medication 500 MG: at 08:56

## 2018-08-04 RX ADMIN — FUROSEMIDE 40 MG: 40 TABLET ORAL at 09:00

## 2018-08-04 RX ADMIN — GABAPENTIN 300 MG: 300 CAPSULE ORAL at 21:29

## 2018-08-04 RX ADMIN — GABAPENTIN 300 MG: 300 CAPSULE ORAL at 16:58

## 2018-08-04 RX ADMIN — ONDANSETRON 4 MG: 2 INJECTION INTRAMUSCULAR; INTRAVENOUS at 19:52

## 2018-08-04 RX ADMIN — Medication 3.38 G: at 02:30

## 2018-08-04 RX ADMIN — Medication 3.38 G: at 21:23

## 2018-08-04 RX ADMIN — COLCHICINE 0.6 MG: 0.6 TABLET, FILM COATED ORAL at 08:57

## 2018-08-04 RX ADMIN — METOPROLOL SUCCINATE 25 MG: 25 TABLET, EXTENDED RELEASE ORAL at 09:00

## 2018-08-04 RX ADMIN — LEVOTHYROXINE SODIUM 50 MCG: 50 TABLET ORAL at 05:21

## 2018-08-04 NOTE — PROGRESS NOTES
Progress Note - Podiatry  Pietro Lazaro 74 y o  male MRN: [id number]  Unit/Bed#: E4 -01 Encounter: [id number]    Assessment/Plan:  3  64yo M w R sub 5th met ulcer [place] 3) with underlying cellulitis and osteomyelitis of 5th metatarsal head  - s/p Right 5th met head resection (DOS 8/2/18)  - WBS: PWB to heel in [facility] wedge shoe on right  - dressing clean, dry and intact, pt refused dressing change today stating he was very tired, agreed to have dressing change tomorrow 8/5  - c/w Zosyn per ID  2  DTI Right hallux and Left 3rd digit would ([place] 1)  3  Hematuria, Dysuria - new complaint  - UA negative for nitrile  - hold pradaxa per SLIM for now   - urology consult placed  3  CAD with Afib  - CABGx4 2011  4  CHF  5  DMII with neuropathy  - diet-controlled  - A1c 6 1  6  Hypothyroidism  7  HTN  8  Hyperbilirubinemia  9  PVD  - stent placed 2015    Dispo: patient will continue to stay due to new onset of urination symptoms    Subjective/Objective   Chief Complaint:   Chief Complaint   Patient presents with    Foot Swelling     pt c/o swelling and pain to right foot  thinks it is infected  just came back from a 9 days cruise  also states on the left foot one of the toes does not appear to be healing right from an old wound  Subjective: 74 y o  y/o male was seen and evaluated at bedside  Reports painful urination overnight with bloody urine  Denies pain to surgical site  Denies F/N/V/C/SOB/CP  Blood pressure 131/83, pulse 83, temperature 98 6 °F (37 °C), temperature source Tympanic, resp  rate 18, height 6' 2" (1 88 m), weight 97 7 kg (215 lb 6 2 oz), SpO2 91 %  ,Body mass index is 27 65 kg/m²  Invasive Devices     Peripheral Intravenous Line            Peripheral IV 08/03/18 Left Forearm less than 1 day                Physical Exam:   General: Alert, cooperative and no distress  Lungs: Non labored breathing  Heart: Positive S1, S2  Abdomen: Soft, non-tender    Extremity:   MSK function and NVS at baseline  Dressing left clean, dry and intact          Lab, Imaging and other studies:   I have personally reviewed pertinent lab results  Imaging: I have personally reviewed pertinent films in PACS  EKG, Pathology, and Other Studies: I have personally reviewed pertinent reports    VTE Pharmacologic Prophylaxis: Heparin

## 2018-08-04 NOTE — PHYSICAL THERAPY NOTE
Orthotic Note    Time in: 5869  Time out: 1428  Total Time: 8 minutes            Date: 8/4/2018      Patient Name: Luke Nieves            Reason for Consult:  darco wedge shoe toe unloading  Pt found to have standard darco shoe donned no toe unloading  PT had central supply deliver male large darco wedge shoe to room  PT appropriately sized and donned shoe  Informed patient of new orders for WBAT per podietry  Following toe unloading darco wedge shoe donning PT attempted re-evaluation/progress note  Pt reports that he hasn't gotten much sleep and it is not a good time  Pt states that maybe tomorrow  PT educated pt on the benefits and role of PT  Pt continued to decline participation at this time  PT will f/u as schedule permits      Keyon Muse, PT,DPT

## 2018-08-04 NOTE — NURSING NOTE
Pt c/o of burning with urination and bloody urine  Patient insist that nothing is being done for him  I explained that the nurse overnight called about it and I spoke with Dr Ximena Orr this morning  Podiatry was also in this morning to see him and sent a UA  I made several attempts to explain what was being done for him, but the patient seems unwilling to hear or understand that information

## 2018-08-04 NOTE — PLAN OF CARE
GASTROINTESTINAL - ADULT     Minimal or absence of nausea and/or vomiting Not Progressing          DISCHARGE PLANNING - CARE MANAGEMENT     Discharge to post-acute care or home with appropriate resources Progressing        Potential for Falls     Patient will remain free of falls Progressing        Prexisting or High Potential for Compromised Skin Integrity     Skin integrity is maintained or improved Progressing        SKIN/TISSUE INTEGRITY - ADULT     Incision(s), wounds(s) or drain site(s) healing without S/S of infection Progressing

## 2018-08-04 NOTE — PLAN OF CARE
DISCHARGE PLANNING - CARE MANAGEMENT     Discharge to post-acute care or home with appropriate resources Progressing        GASTROINTESTINAL - ADULT     Minimal or absence of nausea and/or vomiting Progressing        Potential for Falls     Patient will remain free of falls Progressing        Prexisting or High Potential for Compromised Skin Integrity     Skin integrity is maintained or improved Progressing        SKIN/TISSUE INTEGRITY - ADULT     Incision(s), wounds(s) or drain site(s) healing without S/S of infection Progressing

## 2018-08-04 NOTE — PROGRESS NOTES
Progress Note - Infectious Disease   Ofdanni Holcomb 74 y o  male MRN: [id number]  Unit/Bed#: E4 -01 Encounter: [id number]      Impression/Recommendations:  1  Right foot cellulitis  No history of MDR infections   Seems to be improving on current antibiotics   No associated fevers   Patient remains systemically well, nontoxic  Now wound culture is positive for Pseudomonas and Enterococcus  Will change antibiotic to cover for these organisms with plan for short postoperative course  Blood cultures remain negative      - IV Zosyn for now  Wound to be re-evaluated by Podiatry today  If no ongoing evidence of infection, will plan to stop antibiotic soon  - serial exams     2  Right foot 5th submetatarsal ulceration with underlying osteomyelitis   MRI suggestive of osteomyelitis of the 5th metatarsal head and suggestion of  associated septic arthritis  I explained to the patient that Francisco Seng is a very low probability of complete cure of infection with antibiotics alone  Patient is now status post removal of 5th metatarsal head  Remaining 5th metatarsal appeared healthy and viable  No purulence noted  Clean margin was sent for pathology  Wound to be re-evaluated today by Podiatry      - antibiotic plan as above  - follow-up surgical plans  - close podiatry follow-up ongoing  -followup clean margin pathology to guide final recommendations  - continue with local wound care and dressing changes      3  Peripheral vascular disease   Likely contributing to development of #2  Vascular surgery noted with no plans for intervention at this time       4  Diabetes mellitus type 2 with neuropathy   Also likely contributing to development of severe foot infection       5  Hyperbilirubinemia  6   Diarrhea  May be antibiotic related  Stool for C diff PCR is negative    Diarrhea has improved         Antibiotics:   IV cefazolin/ oral Flagyl #4     Discussed above plan with patient and his girlfriend  Subjective:  Patient complained of multiple episodes of watery diarrhea for which a stool for C diff was checked  Also complained of nausea with nonbilious vomiting  Diarrhea has improved today  No fevers, chills  Objective:  Vitals:  HR:  [] 83  Resp:  [18] 18  BP: (131-163)/(67-98) 131/83  SpO2:  [91 %-94 %] 91 %  Temp (24hrs), Av 5 °F (36 9 °C), Min:98 3 °F (36 8 °C), Max:98 7 °F (37 1 °C)  Current: Temperature: 98 6 °F (37 °C)    Physical Exam:   General:  No acute distress  Psychiatric:  Awake and alert  Pulmonary:  Normal respiratory excursion without accessory muscle use  Abdomen:  Soft, nontender  Extremities:  No edema  Foot dressing intact  Skin:  No rashes    Lab Results:  I have personally reviewed pertinent labs  Results from last 7 days  Lab Units 18  0517 18  0930 18  0438 18  0445   SODIUM mmol/L 137 135* 138 135*   POTASSIUM mmol/L 3 6 3 7 3 8 3 9   CHLORIDE mmol/L 99* 99* 99* 100   CO2 mmol/L 27 25 29 29   ANION GAP mmol/L 11 11 10 6   BUN mg/dL 22 21 20 18   CREATININE mg/dL 1 06 1 11 1 18 1 01   EGFR ml/min/1 73sq m 69 65 60 73   GLUCOSE RANDOM mg/dL 144* 157* 122 97   CALCIUM mg/dL 9 1 9 4 9 0 9 1   AST U/L  --  31 25 22   ALT U/L  --  17 13 15   ALK PHOS U/L  --  94 96 105   TOTAL PROTEIN g/dL  --  7 8 7 2 6 9   BILIRUBIN TOTAL mg/dL  --  2 08* 1 57* 1 73*       Results from last 7 days  Lab Units 18  0517 18  0437 18  0438   WBC Thousand/uL 10 93* 11 63* 11 42*   HEMOGLOBIN g/dL 17 5* 17 9* 17 6*   PLATELETS Thousands/uL 157 158 170       Results from last 7 days  Lab Units 18  1236 18  1742 18  1739 18  1619   BLOOD CULTURE   --  No Growth at 72 hrs   No Growth at 72 hrs   --    GRAM STAIN RESULT   --   --   --  No polys seen  2+ Gram positive cocci in pairs   WOUND CULTURE   --   --   --  1+ Growth of Pseudomonas aeruginosa*  2+ Growth of Enterococcus faecalis*  1+ Growth of    C DIFF TOXIN B  NEGATIVE for C difficle toxin by PCR    --   --   --        Imaging Studies:   I have personally reviewed pertinent imaging study reports and images in PACS  EKG, Pathology, and Other Studies:   I have personally reviewed pertinent reports

## 2018-08-04 NOTE — PROGRESS NOTES
[facility] Progress Note  Patient: Sia Metcalf 74 y o  male   MRN: [id number]  PCP: No primary care provider on file  Unit/Bed#: E4 -01 Encounter: [id number]  Date Of Visit: 08/04/18    Assessment:    Principal Problem:    Cellulitis of right foot  Active Problems:    Diet-controlled diabetes mellitus ([address] )    Atherosclerosis of native artery of right lower extremity with ulceration (HCC)    Chronic multifocal osteomyelitis of right foot (HCC)    Abdominal aortic aneurysm (AAA) without rupture (HCC)    Venous insufficiency (chronic) (peripheral)      Plan:    · Right plantar 5th metatarsal ulcer and distal left 3rd toe ulcer  ? MRI suggestive of osteomyelitis of 5th metatarsal head and suggestive of associated septic arthritis  ? Infectious Disease consulted, recommending to continue with IV Zosyn  ? Cultures growing Pseudomonas and Enterococcus,  ? Further management as per podiatry     · CAD status post CABG  ? Patient not have any active chest pain, dyspnea or palpitations     · Atrial fibrillation  ? Restart Pradaxa as per Cardiology recommendation  ? continue with metoprolol for rate control     · History of CHF-unspecified  ? Unclear whether this is systolic or diastolic, patient maintained on Lasix at home, continue     · Diabetes mellitus  ? Diet controlled, monitor Accu-Cheks, sliding scale for coverage  ? HbA1c 6 1  ? Blood sugars well controlled     · Hypothyroidism  ? Continue with levothyroxine     · Hypertension  ? Continue with metoprolol     · Hyperlipidemia  ? Patient states he does not have history of hyperlipidemia and states he is allergic to statin     · History of peripheral vascular disease  ? Vascular surgery consulted  ?  Continue with Pradaxa, no further vascular intervention at this time     Diarrhea        -C diff ruled out, possibly secondary to antibiotics  -colchicine decreased    Hematuria  -confirm with urinalysis, unclear etiology at this point  -renal bladder ultrasound ordered  -bladder scan  -recommend urology consultation for further evaluation      VTE Pharmacologic Prophylaxis:   Pharmacologic:  Pradaxa currently on hold due to hematuria    Patient Centered Rounds: I have performed bedside rounds with nursing staff today  Discussions with Specialists or Other Care Team Provider:  Podiatry team, cardiology    Time Spent for Care: 20 minutes  More than 50% of total time spent on counseling and coordination of care as described above  Current Length of Stay: 4 day(s)      Subjective:   Patient seen and examined at bedside, complaining of abdominal soreness, occasional nausea and nonbloody nonbilious vomiting  Also complaining of having hematuria  Denies any dysuria or urinary discomfort  Objective:     Vitals:   Temp (24hrs), Av 5 °F (36 9 °C), Min:98 3 °F (36 8 °C), Max:98 7 °F (37 1 °C)    HR:  [] 83  Resp:  [18] 18  BP: (131-163)/(67-98) 131/83  SpO2:  [91 %-94 %] 91 %  Body mass index is 27 65 kg/m²  Input and Output Summary (last 24 hours): Intake/Output Summary (Last 24 hours) at 18 1447  Last data filed at 18 1357   Gross per 24 hour   Intake                0 ml   Output              675 ml   Net             -675 ml       Physical Exam:    Constitutional: Patient is oriented to person, place and time, no acute distress  HEENT:  Normocephalic, atraumatic, EOMI, PERRLA, no scleral icterus, no pallor, moist oral mucosa  Neck:  Supple, no masses, no thyromegaly, no bruits Normal range of motion  Lymph nodes:  No lymphadenopathy  Cardiovascular: Normal S1S2, RRR, No murmurs/rubs/gallops appreciated  Pulmonary: Clear to auscultation bilaterally, No rhonchi/rales/wheezing appreciated  Abdominal: Soft, Bowel sounds present, Non-tender, Non-distended, No rebound/guarding, no hepatomegaly   Musculoskeletal:  Right foot in dressing  Extremities:  No cyanosis, clubbing or edema   Peripheral pulses palpable and equal bilaterally  Neurological: Cranial nerves II-XII grossly intact, sensation intact, otherwise no focal neurological symptoms  Skin: Skin is warm and dry, no rashes  Additional Data:     Labs:      Results from last 7 days  Lab Units 08/04/18  0517   WBC Thousand/uL 10 93*   HEMOGLOBIN g/dL 17 5*   HEMATOCRIT % 50 6*   PLATELETS Thousands/uL 157   NEUTROS PCT % 80*   LYMPHS PCT % 14   MONOS PCT % 6   EOS PCT % 0       Results from last 7 days  Lab Units 08/04/18  0517 08/03/18  0930   SODIUM mmol/L 137 135*   POTASSIUM mmol/L 3 6 3 7   CHLORIDE mmol/L 99* 99*   CO2 mmol/L 27 25   BUN mg/dL 22 21   CREATININE mg/dL 1 06 1 11   CALCIUM mg/dL 9 1 9 4   TOTAL PROTEIN g/dL  --  7 8   BILIRUBIN TOTAL mg/dL  --  2 08*   ALK PHOS U/L  --  94   ALT U/L  --  17   AST U/L  --  31   GLUCOSE RANDOM mg/dL 144* 157*       Results from last 7 days  Lab Units 07/31/18  1544   INR  1 50*        I Have Reviewed All Lab Data Listed Above  Recent Cultures (last 7 days):       Results from last 7 days  Lab Units 08/03/18  1236 07/31/18  1742 07/31/18  1739 07/31/18  1619   BLOOD CULTURE   --  No Growth at 72 hrs   No Growth at 72 hrs   --    GRAM STAIN RESULT   --   --   --  No polys seen  2+ Gram positive cocci in pairs   WOUND CULTURE   --   --   --  1+ Growth of Pseudomonas aeruginosa*  2+ Growth of Enterococcus faecalis*  1+ Growth of    C DIFF TOXIN B  NEGATIVE for C difficle toxin by PCR    --   --   --        Last 24 Hours Medication List:     Current Facility-Administered Medications:  acetaminophen 650 mg Oral Q6H PRN Pollie Saucer, DPM    allopurinol 300 mg Oral Daily Pollie Saucer, DPM    colchicine 0 6 mg Oral Daily Misael Suarez MD    eplerenone 25 mg Oral Daily Pollie Saucer, DPM    furosemide 40 mg Oral Daily Pollie Saucer, DPM    gabapentin 300 mg Oral TID Pollie Saucer, DPM    levothyroxine 50 mcg Oral Early Morning Pollie Saucer, DPM    magnesium gluconate 500 mg Oral Daily Pollie Saucer, DPM    melatonin 3 mg Oral HS Attiya Hal Zelaya MD    metolazone 2 5 mg Oral Once per day on Mon Wed Fri Diann Hammond, DPEMMA    metoprolol 5 mg Intravenous Q4H PRN Kassi Christopher PA-C    metoprolol succinate 25 mg Oral Daily Kassi Christopher PA-C    ondansetron 4 mg Intravenous Q6H PRN David Purple, DPM    oxyCODONE-acetaminophen 1 tablet Oral Q4H PRN Rogelio Pruden, DPM    oxyCODONE-acetaminophen 2 tablet Oral Q4H PRN Rogelio Pruden, DPM    piperacillin-tazobactam 3 375 g Intravenous Q6H Meme Aldea, DO Last Rate: 3 375 g (08/04/18 0859)   sodium chloride 125 mL/hr Intravenous Continuous Larinda [place], DO Last Rate: 125 mL/hr (08/02/18 0723)        Today, Patient Was Seen By: Kenn Abbasi MD

## 2018-08-04 NOTE — PROGRESS NOTES
Pt c/o pain and burning with urination  Urine clear, dark red in color  RRNP notified, no new orders at this time  Will continue to monitor

## 2018-08-04 NOTE — OCCUPATIONAL THERAPY NOTE
Occupational Therapy Cancellation Note:    Patient Name: Juan Antonio May  Today's Date: 8/4/2018     Attempted to see pt for OT treatment session this PM, however pt and pt's friend requested to hold off on treatment session until tomorrow 2* increased fatigue  Educated pt on purpose and benefit of OT evaluation and OOB activity, however pt continued to decline at this time  Therefore, pt cx'd this PM  OT to follow pt on caseload as able to A w/ safe d/c planning and recommendations       Jamir Martinez OTR/L

## 2018-08-05 LAB
ANION GAP SERPL CALCULATED.3IONS-SCNC: 5 MMOL/L (ref 4–13)
BACTERIA BLD CULT: NORMAL
BACTERIA BLD CULT: NORMAL
BASOPHILS # BLD AUTO: 0.01 THOUSANDS/ΜL (ref 0–0.1)
BASOPHILS NFR BLD AUTO: 0 % (ref 0–1)
BUN SERPL-MCNC: 23 MG/DL (ref 5–25)
CALCIUM SERPL-MCNC: 8.9 MG/DL (ref 8.3–10.1)
CHLORIDE SERPL-SCNC: 99 MMOL/L (ref 100–108)
CO2 SERPL-SCNC: 30 MMOL/L (ref 21–32)
CREAT SERPL-MCNC: 0.99 MG/DL (ref 0.6–1.3)
EOSINOPHIL # BLD AUTO: 0.03 THOUSAND/ΜL (ref 0–0.61)
EOSINOPHIL NFR BLD AUTO: 0 % (ref 0–6)
ERYTHROCYTE [DISTWIDTH] IN BLOOD BY AUTOMATED COUNT: 15.8 % (ref 11.6–15.1)
GFR SERPL CREATININE-BSD FRML MDRD: 75 ML/MIN/1.73SQ M
GLUCOSE SERPL-MCNC: 145 MG/DL (ref 65–140)
HCT VFR BLD AUTO: 50.8 % (ref 36.5–49.3)
HGB BLD-MCNC: 17.6 G/DL (ref 12–17)
LYMPHOCYTES # BLD AUTO: 2.08 THOUSANDS/ΜL (ref 0.6–4.47)
LYMPHOCYTES NFR BLD AUTO: 22 % (ref 14–44)
MCH RBC QN AUTO: 35.6 PG (ref 26.8–34.3)
MCHC RBC AUTO-ENTMCNC: 34.6 G/DL (ref 31.4–37.4)
MCV RBC AUTO: 103 FL (ref 82–98)
MONOCYTES # BLD AUTO: 0.45 THOUSAND/ΜL (ref 0.17–1.22)
MONOCYTES NFR BLD AUTO: 5 % (ref 4–12)
NEUTROPHILS # BLD AUTO: 6.88 THOUSANDS/ΜL (ref 1.85–7.62)
NEUTS SEG NFR BLD AUTO: 73 % (ref 43–75)
NRBC BLD AUTO-RTO: 0 /100 WBCS
PLATELET # BLD AUTO: 165 THOUSANDS/UL (ref 149–390)
PMV BLD AUTO: 10.6 FL (ref 8.9–12.7)
POTASSIUM SERPL-SCNC: 3.8 MMOL/L (ref 3.5–5.3)
RBC # BLD AUTO: 4.94 MILLION/UL (ref 3.88–5.62)
SODIUM SERPL-SCNC: 134 MMOL/L (ref 136–145)
WBC # BLD AUTO: 9.45 THOUSAND/UL (ref 4.31–10.16)

## 2018-08-05 PROCEDURE — 97164 PT RE-EVAL EST PLAN CARE: CPT

## 2018-08-05 PROCEDURE — 99232 SBSQ HOSP IP/OBS MODERATE 35: CPT | Performed by: INTERNAL MEDICINE

## 2018-08-05 PROCEDURE — 85025 COMPLETE CBC W/AUTO DIFF WBC: CPT | Performed by: INTERNAL MEDICINE

## 2018-08-05 PROCEDURE — G8979 MOBILITY GOAL STATUS: HCPCS

## 2018-08-05 PROCEDURE — G8987 SELF CARE CURRENT STATUS: HCPCS

## 2018-08-05 PROCEDURE — 87086 URINE CULTURE/COLONY COUNT: CPT | Performed by: UROLOGY

## 2018-08-05 PROCEDURE — G8978 MOBILITY CURRENT STATUS: HCPCS

## 2018-08-05 PROCEDURE — 97168 OT RE-EVAL EST PLAN CARE: CPT

## 2018-08-05 PROCEDURE — 99222 1ST HOSP IP/OBS MODERATE 55: CPT | Performed by: UROLOGY

## 2018-08-05 PROCEDURE — 88112 CYTOPATH CELL ENHANCE TECH: CPT | Performed by: PATHOLOGY

## 2018-08-05 PROCEDURE — 97116 GAIT TRAINING THERAPY: CPT

## 2018-08-05 PROCEDURE — G8988 SELF CARE GOAL STATUS: HCPCS

## 2018-08-05 PROCEDURE — 80048 BASIC METABOLIC PNL TOTAL CA: CPT | Performed by: INTERNAL MEDICINE

## 2018-08-05 RX ORDER — DABIGATRAN ETEXILATE 150 MG/1
150 CAPSULE, COATED PELLETS ORAL EVERY 12 HOURS SCHEDULED
Status: DISCONTINUED | OUTPATIENT
Start: 2018-08-05 | End: 2018-08-07 | Stop reason: HOSPADM

## 2018-08-05 RX ORDER — SACCHAROMYCES BOULARDII 250 MG
250 CAPSULE ORAL 2 TIMES DAILY
Status: DISCONTINUED | OUTPATIENT
Start: 2018-08-05 | End: 2018-08-07 | Stop reason: HOSPADM

## 2018-08-05 RX ADMIN — MELATONIN TAB 3 MG 3 MG: 3 TAB at 21:48

## 2018-08-05 RX ADMIN — Medication 3.38 G: at 17:06

## 2018-08-05 RX ADMIN — Medication 250 MG: at 10:49

## 2018-08-05 RX ADMIN — COLCHICINE 0.6 MG: 0.6 TABLET, FILM COATED ORAL at 09:44

## 2018-08-05 RX ADMIN — DABIGATRAN ETEXILATE MESYLATE 150 MG: 150 CAPSULE ORAL at 21:47

## 2018-08-05 RX ADMIN — Medication 250 MG: at 17:06

## 2018-08-05 RX ADMIN — EPLERENONE 25 MG: 25 TABLET, FILM COATED ORAL at 09:45

## 2018-08-05 RX ADMIN — ALLOPURINOL 300 MG: 100 TABLET ORAL at 09:44

## 2018-08-05 RX ADMIN — GABAPENTIN 300 MG: 300 CAPSULE ORAL at 21:47

## 2018-08-05 RX ADMIN — Medication 3.38 G: at 03:44

## 2018-08-05 RX ADMIN — GABAPENTIN 300 MG: 300 CAPSULE ORAL at 09:44

## 2018-08-05 RX ADMIN — LEVOTHYROXINE SODIUM 50 MCG: 50 TABLET ORAL at 05:45

## 2018-08-05 RX ADMIN — Medication 500 MG: at 09:44

## 2018-08-05 RX ADMIN — METOPROLOL SUCCINATE 25 MG: 25 TABLET, EXTENDED RELEASE ORAL at 09:44

## 2018-08-05 RX ADMIN — GABAPENTIN 300 MG: 300 CAPSULE ORAL at 17:06

## 2018-08-05 RX ADMIN — ONDANSETRON 4 MG: 2 INJECTION INTRAMUSCULAR; INTRAVENOUS at 10:58

## 2018-08-05 RX ADMIN — FUROSEMIDE 40 MG: 40 TABLET ORAL at 09:44

## 2018-08-05 RX ADMIN — Medication 3.38 G: at 21:48

## 2018-08-05 RX ADMIN — Medication 3.38 G: at 10:49

## 2018-08-05 NOTE — CONSULTS
UROLOGY CONSULTATION NOTE     Patient Identifiers: Robert Lee (MRN [id number])  Service Requesting Consultation:  Hospitalist  Service Providing Consultation:  Urology, Bartolome Garcia MD    Date of Service: 8/5/2018  Consults    Reason for Consultation:  Hematuria      History of Present Illness:     Robert Lee is a 74 y o  old with cellulitis of foot, osteomyelitis and numerous other associated medical issues  Urine slightly tea-colored, urinalysis shows numerous red cells and no white cells  Jarrell was in for some time, removed several days ago  Patient says he is voiding okay, no real hesitancy urgency, dysuria  Baseline voiding symptoms occasional nocturia but flow good  Denies prior urology history of any type  Ultrasound of kidneys and bladder done yesterday, kidneys are normal   Bladder noted to have focal thickening of right lateral wall  Prior smoker  ASSESSMENT:     74 y o  old male with  hematuria, possibly related to anticoagulation and Jarrell placement  However, long smoking history and ultrasound findings indicate he should have cystoscopy at some point  Follow his voiding pattern for now, I will send off a urine cytology, and urine culture              Past Medical, Past Surgical History:     Past Medical History:   Diagnosis Date    Cardiac disease     CHF (congestive heart failure) ([facility] )     Coronary artery disease     Diabetes mellitus ([facility] )     Disease of thyroid gland     Fracture, humerus     GERD (gastroesophageal reflux disease)     Hypertension     Prediabetes     Wrist fracture    :    Past Surgical History:   Procedure Laterality Date    CORONARY ARTERY BYPASS GRAFT      CT AMPUTATION METATARSAL+TOE,SINGLE Right 8/2/2018    Procedure: RAY RESECTION FOOT;  Surgeon: Suki Sanders MD;  Location: [facility] OR;  Service: Podiatry   :    Medications, Allergies:     Current Facility-Administered Medications   Medication Dose Route Frequency    acetaminophen (TYLENOL) tablet 650 mg  650 mg Oral Q6H PRN    allopurinol (ZYLOPRIM) tablet 300 mg  300 mg Oral Daily    colchicine (COLCRYS) tablet 0 6 mg  0 6 mg Oral Daily    eplerenone (INSPRA) tablet 25 mg  25 mg Oral Daily    furosemide (LASIX) tablet 40 mg  40 mg Oral Daily    gabapentin (NEURONTIN) capsule 300 mg  300 mg Oral TID    levothyroxine tablet 50 mcg  50 mcg Oral Early Morning    magnesium gluconate (MAGONATE) tablet 500 mg  500 mg Oral Daily    melatonin tablet 3 mg  3 mg Oral HS    metolazone (ZAROXOLYN) tablet 2 5 mg  2 5 mg Oral Once per day on Mon Wed Fri    metoprolol (LOPRESSOR) injection 5 mg  5 mg Intravenous Q4H PRN    metoprolol succinate (TOPROL-XL) 24 hr tablet 25 mg  25 mg Oral Daily    ondansetron (ZOFRAN) injection 4 mg  4 mg Intravenous Q6H PRN    oxyCODONE-acetaminophen (PERCOCET) 5-325 mg per tablet 1 tablet  1 tablet Oral Q4H PRN    oxyCODONE-acetaminophen (PERCOCET) 5-325 mg per tablet 2 tablet  2 tablet Oral Q4H PRN    piperacillin-tazobactam (ZOSYN) 3 375 g in sodium chloride 0 9 % 50 mL IVPB  3 375 g Intravenous Q6H    sodium chloride 0 9 % infusion  125 mL/hr Intravenous Continuous       Allergies: Allergies   Allergen Reactions    Ace Inhibitors     Other      High fructose corn syrup    Statins    :    Social and Family History:   Social History:   Social History   Substance Use Topics    Smoking status: Former Smoker    Smokeless tobacco: Never Used    Alcohol use Yes      Comment: "very little"     History   Smoking Status    Former Smoker   Smokeless Tobacco    Never Used       Family History:  History reviewed  No pertinent family history :     Review of Systems:     General: Denies Fever, chills, or night sweats  Cardiac: Negative for chest pain  Pulmonary: Negative for shortness of breath  Gastrointestinal: Neg  Genitourinary:  Negative  All other systems queried were negative      Physical Exam:     General appearance: Fatigue, mildly uncomfortable, depressed about his condition  Head: Normocephalic, without obvious abnormality, atraumatic  Neck: no adenopathy, no carotid bruit, no JVD, supple, symmetrical, trachea midline and thyroid not enlarged, symmetric, no tenderness/mass/nodules  Lungs: clear to auscultation bilaterally  Heart: regular rate and rhythm, S1, S2 normal, no murmur, click, rub or gallop  Abdomen: soft, non-tender; bowel sounds normal; no masses,  no organomegaly  Male genitalia: normal  Extremities: extremities normal, warm and well-perfused; no cyanosis, clubbing, or edema  Pulses: 2+ and symmetric  Lymph nodes: Cervical, supraclavicular, and axillary nodes normal   Neurologic: Grossly normal      Labs:     Lab Results   Component Value Date    HGB 17 6 (H) 08/05/2018    HCT 50 8 (H) 08/05/2018    WBC 9 45 08/05/2018     08/05/2018   ]    Lab Results   Component Value Date     (L) 08/05/2018    K 3 8 08/05/2018    CL 99 (L) 08/05/2018    CO2 30 08/05/2018    BUN 23 08/05/2018    CREATININE 0 99 08/05/2018    CALCIUM 8 9 08/05/2018    GLUCOSE 145 (H) 08/05/2018   ]    Imaging:     I personally reviewed the images and report of the following studies, and reviewed them with the patient:    Ultrasound of kidneys and bladder          Thank you for allowing me to participate in this patients care  Please do not hesitate to call with any additional questions    Grupo Ivory MD

## 2018-08-05 NOTE — PLAN OF CARE
Problem: PHYSICAL THERAPY ADULT  Goal: Performs mobility at highest level of function for planned discharge setting  See evaluation for individualized goals  Treatment/Interventions: Functional transfer training, LE strengthening/ROM, Therapeutic exercise, Endurance training, Patient/family training, Equipment eval/education, Bed mobility, Gait training, Spoke to nursing, OT, Family  Equipment Recommended: Kerri Ahuja (patient refusing RW and will only use his cane at home)       See flowsheet documentation for full assessment, interventions and recommendations  Outcome: Progressing  Prognosis: Fair  Problem List: Decreased strength, Decreased range of motion, Decreased endurance, Impaired balance, Decreased mobility, Decreased safety awareness, Decreased skin integrity, Orthopedic restrictions  Assessment: PT re-evaluation and treatment note completed 2* updated orders from podietry for WBAT in darco wedge toe unloading shoe RLE  Pt was NWB  PT fitted patient with darco wedge toe unloading shoe yesterday  Today patient identified by name and birth date and agreeable to participate, so he can go home  Pt denying any pain and received supine in bed  Girlfriend arrived mid treatment  Pt demonstrates S supine>sit, sit<>stand S w/ RW and SPC, gait training S w/ RW, Yeimy and progressed to S gait training w/ SPC limited by fatigue and deconditioning, demonstrates 4/5 BLE MMT  Pt has 2 AJIT at home no railing  Pt denies any need to practice them before discharge home  Pt declines use/want for RW at home and states that he will use his cane  Provided education on RW vs  SPC for optimal support for mobility  Pt continued to prefer cane  Pt left sitting EOB at end of treatment w/ all needs in reach, call bell and phone in hand, darco wedge toe unloading shoe gabino, girlfriend present and RN aware of patient status  Pt wants to go home today if possible     Barriers to Discharge: Inaccessible home environment  Barriers to Discharge Comments: AJIT; refuses to take home RW for home  Recommendation: Home PT, Home with family support     PT - OK to Discharge: Yes    See flowsheet documentation for full assessment         Comments: Chinedu Velez, PT,DPT

## 2018-08-05 NOTE — PROGRESS NOTES
[facility] Progress Note  Patient: Pietro Lazaro 74 y o  male   MRN: [id number]  PCP: No primary care provider on file  Unit/Bed#: E4 -01 Encounter: [id number]  Date Of Visit: 08/05/18    Assessment:    Principal Problem:    Cellulitis of right foot  Active Problems:    Diet-controlled diabetes mellitus ([address] )    Atherosclerosis of native artery of right lower extremity with ulceration (HCC)    Chronic multifocal osteomyelitis of right foot (HCC)    Abdominal aortic aneurysm (AAA) without rupture (HCC)    Venous insufficiency (chronic) (peripheral)      Plan:    · Right plantar 5th metatarsal ulcer and distal left 3rd toe ulcer  ? MRI suggestive of osteomyelitis of 5th metatarsal head and suggestive of associated septic arthritis  ? Infectious Disease consulted, recommending to continue with IV Zosyn  ? Cultures growing Pseudomonas and Enterococcus,  ? Further management as per podiatry     · CAD status post CABG  ? Patient not have any active chest pain, dyspnea or palpitations     · Atrial fibrillation  ? Restart Pradaxa as per Cardiology recommendation  ? continue with metoprolol for rate control     · History of CHF-unspecified  ? Unclear whether this is systolic or diastolic, patient maintained on Lasix at home, continue     · Diabetes mellitus  ? Diet controlled, monitor Accu-Cheks, sliding scale for coverage  ? HbA1c 6 1  ? Blood sugars well controlled     · Hypothyroidism  ? Continue with levothyroxine     · Hypertension  ? Continue with metoprolol     · Hyperlipidemia  ? Patient states he does not have history of hyperlipidemia and states he is allergic to statin     · History of peripheral vascular disease  ? Vascular surgery consulted  ?  Continue with Pradaxa, no further vascular intervention at this time     Diarrhea        -C diff ruled out, possibly secondary to antibiotics  -colchicine decreased     Hematuria  -confirm with urinalysis, unclear etiology at this point  -renal bladder ultrasound reveals focal lobular thickening of posterior right bladder wall, no hydronephrosis  -hemoglobin stable  -urology consult stating to monitor voiding pattern, urine cytology and urine culture  -patient may need cystoscopy at some point          VTE Pharmacologic Prophylaxis:   Pharmacologic:  Pradaxa currently on hold due to hematuria    Patient Centered Rounds: I have performed bedside rounds with nursing staff today  Time Spent for Care: 20 minutes  More than 50% of total time spent on counseling and coordination of care as described above    Subjective:   Patient seen and examined at bedside, currently denies any abdominal pain, chest pain, dyspnea  Denies any nausea, vomiting, diarrhea  Objective:     Vitals:   Temp (24hrs), Av 4 °F (36 3 °C), Min:96 °F (35 6 °C), Max:98 4 °F (36 9 °C)    HR:  [82-96] 92  Resp:  [16-18] 16  BP: (125-158)/(70-96) 158/96  SpO2:  [90 %-94 %] 90 %  Body mass index is 27 65 kg/m²  Input and Output Summary (last 24 hours): Intake/Output Summary (Last 24 hours) at 18 1459  Last data filed at 18 1328   Gross per 24 hour   Intake               50 ml   Output              849 ml   Net             -799 ml       Physical Exam:    Constitutional: Patient is oriented to person, place and time, no acute distress  HEENT:  Normocephalic, atraumatic, EOMI, PERRLA, no scleral icterus, no pallor, moist oral mucosa  Neck:  Supple, no masses, no thyromegaly, no bruits Normal range of motion  Lymph nodes:  No lymphadenopathy  Cardiovascular: Normal S1S2, RRR, No murmurs/rubs/gallops appreciated  Pulmonary: Clear to auscultation bilaterally, No rhonchi/rales/wheezing appreciated  Abdominal: Soft, Bowel sounds present, Non-tender, Non-distended, No rebound/guarding, no hepatomegaly   Musculoskeletal: No tenderness/abnormality   Extremities:  No cyanosis, clubbing or edema   Peripheral pulses palpable and equal bilaterally  Neurological: Cranial nerves II-XII grossly intact, sensation intact, otherwise no focal neurological symptoms  Skin: Skin is warm and dry, no rashes  Additional Data:     Labs:      Results from last 7 days  Lab Units 08/05/18  0600   WBC Thousand/uL 9 45   HEMOGLOBIN g/dL 17 6*   HEMATOCRIT % 50 8*   PLATELETS Thousands/uL 165   NEUTROS PCT % 73   LYMPHS PCT % 22   MONOS PCT % 5   EOS PCT % 0       Results from last 7 days  Lab Units 08/05/18  0600  08/03/18  0930   SODIUM mmol/L 134*  < > 135*   POTASSIUM mmol/L 3 8  < > 3 7   CHLORIDE mmol/L 99*  < > 99*   CO2 mmol/L 30  < > 25   BUN mg/dL 23  < > 21   CREATININE mg/dL 0 99  < > 1 11   CALCIUM mg/dL 8 9  < > 9 4   TOTAL PROTEIN g/dL  --   --  7 8   BILIRUBIN TOTAL mg/dL  --   --  2 08*   ALK PHOS U/L  --   --  94   ALT U/L  --   --  17   AST U/L  --   --  31   GLUCOSE RANDOM mg/dL 145*  < > 157*   < > = values in this interval not displayed  Results from last 7 days  Lab Units 07/31/18  1544   INR  1 50*        I Have Reviewed All Lab Data Listed Above  Recent Cultures (last 7 days):       Results from last 7 days  Lab Units 08/03/18  1236 07/31/18  1742 07/31/18  1739 07/31/18  1619   BLOOD CULTURE   --  No Growth After 4 Days  No Growth After 4 Days    --    GRAM STAIN RESULT   --   --   --  No polys seen  2+ Gram positive cocci in pairs   WOUND CULTURE   --   --   --  1+ Growth of Pseudomonas aeruginosa*  2+ Growth of Enterococcus faecalis*  1+ Growth of    C DIFF TOXIN B  NEGATIVE for C difficle toxin by PCR    --   --   --        Last 24 Hours Medication List:     Current Facility-Administered Medications:  acetaminophen 650 mg Oral Q6H PRN Darlina Lo, DPM    allopurinol 300 mg Oral Daily Darlina Lo, DPM    colchicine 0 6 mg Oral Daily Svetlana Canela MD    eplerenone 25 mg Oral Daily Darlina Lo, DPM    furosemide 40 mg Oral Daily Darlina Lo, DPM    gabapentin 300 mg Oral TID Darlina Lo, DPM    levothyroxine 50 mcg Oral Early Morning Darlina Lo, DPM    magnesium gluconate 500 mg Oral Daily Yaa Vázquez, DPM    melatonin 3 mg Oral HS Thanh Marshall MD    metolazone 2 5 mg Oral Once per day on Mon Wed Fri Yaa Vázquez, DPM    metoprolol 5 mg Intravenous Q4H PRN Stephanie Haus, DOMINICK    metoprolol succinate 25 mg Oral Daily Stephanie Haus, DOMINICK    ondansetron 4 mg Intravenous Q6H PRN Aline Cox, DPM    oxyCODONE-acetaminophen 1 tablet Oral Q4H PRN Adam [place], DPM    oxyCODONE-acetaminophen 2 tablet Oral Q4H PRN Adam [place], DPM    piperacillin-tazobactam 3 375 g Intravenous Q6H Meme Lutz, DO Last Rate: Stopped (08/05/18 1119)   saccharomyces boulardii 250 mg Oral BID Krista Gamez, DPM    sodium chloride 125 mL/hr Intravenous Continuous Hodan Marcano, DO Last Rate: 125 mL/hr (08/02/18 0723)        Today, Patient Was Seen By: Thanh Marshall MD

## 2018-08-05 NOTE — PHYSICAL THERAPY NOTE
PHYSICAL THERAPY NOTE          Patient Name: Natasha Burnett  [id number]'V Date: 8/5/2018     Time in: 1340  Time out: 1406  Total Time: 26 minutes     08/05/18 1406   Note Type   Note type Re-eval   Pain Assessment   Pain Assessment No/denies pain   Pain Score No Pain   Home Living   Additional Comments see initial evaluation from 8/3 for home living   Prior Function   Comments see initial evaluation from 8/3 for prior function   Restrictions/Precautions   Weight Bearing Precautions Per Order Yes   RLE Weight Bearing Per Order WBAT  (R heel in darco toe unloading shoe)   Braces or Orthoses (darco wedge toe unloading)   Other Precautions WBS; Fall Risk   General   Additional Pertinent History Pt with updated orders from podietry for WBAT in darco wedge toe unloading shoe RLE  Family/Caregiver Present (friend arrived mid re-eval/progress note)   Cognition   Overall Cognitive Status WFL   Arousal/Participation Alert   Attention Within functional limits   Orientation Level Oriented X4   Memory Within functional limits   Following Commands Follows one step commands without difficulty   Comments Pt does best when spoke in gentle soft spoken voice and not feeling rushed  RUE Assessment   RUE Assessment WFL  (see OT note(s))   LUE Assessment   LUE Assessment WFL  (see OT note(s))   RLE Assessment   RLE Assessment WFL   Strength RLE   RLE Overall Strength 4/5   LLE Assessment   LLE Assessment WFL   Strength LLE   LLE Overall Strength 4/5   Coordination   Movements are Fluid and Coordinated 1   Sensation X  (h/o neuropathy BLE)   Bed Mobility   Supine to Sit 5  Supervision   Additional items Assist x 1;Bedrails; Increased time required;Verbal cues;HOB elevated   Sit to Supine Unable to assess   Additional Comments Pt received supine in bed  Transfers   Sit to Stand 5  Supervision   Additional items Assist x 1; Increased time required;Verbal cues Stand to Sit 5  Supervision   Additional items Assist x 1; Increased time required;Verbal cues   Additional Comments Initial transfer performed w/ RW for practice  VC for hand placement and foot placement  This was first time patient up in toe unloading shoe  Pt performed second sit<>stand transfer w/ S and increased time w/ SPC  Pt declines RW for home and plans to use of cane  Ambulation/Elevation   Gait pattern Short stride;Decreased foot clearance; Step to; Forward Flexion   Gait Assistance 5  Supervision   Additional items Assist x 1;Verbal cues   Assistive Device Rolling walker;Straight cane   Distance initially ambulated 12ftx2 w/ RW and S  Pt progressed then to straight cane in R hand  Initially patient needing Yeimy for first 4 ft but then able to progress to S w/ SPC 15ftx1  Stair Management Assistance Not tested  (Pt denies need to practice 2 steps for home )   Balance   Static Sitting Good   Dynamic Sitting Fair +   Static Standing Fair   Dynamic Standing Fair   Ambulatory Fair -   Endurance Deficit   Endurance Deficit Yes   Endurance Deficit Description deconditioned, fatigue, and weakness   Activity Tolerance   Activity Tolerance Patient limited by fatigue   Nurse Teena Rodriguez RN   Assessment   Prognosis Fair   Problem List Decreased strength;Decreased range of motion;Decreased endurance; Impaired balance;Decreased mobility; Decreased safety awareness;Decreased skin integrity;Orthopedic restrictions   Assessment PT re-evaluation and treatment note completed 2* updated orders from podietry for WBAT in darco wedge toe unloading shoe RLE  Pt was NWB  PT fitted patient with darco wedge toe unloading shoe yesterday  Today patient identified by name and birth date and agreeable to participate, so he can go home  Pt denying any pain and received supine in bed  Girlfriend arrived mid treatment   Pt demonstrates S supine>sit, sit<>stand S w/ RW and SPC, gait training S w/ RW, Yeimy and progressed to S gait training w/ SPC limited by fatigue and deconditioning, demonstrates 4/5 BLE MMT  Pt has 2 AJIT at home no railing  Pt denies any need to practice them before discharge home  Pt declines use/want for RW at home and states that he will use his cane  Provided education on RW vs  SPC for optimal support for mobility  Pt continued to prefer cane  Pt left sitting EOB at end of treatment w/ all needs in reach, call bell and phone in hand, darco wedge toe unloading shoe donned, girlfriend present and RN aware of patient status  Pt wants to go home today if possible  Barriers to Discharge Inaccessible home environment   Barriers to Discharge Comments AJIT; refuses to take home RW for home   Goals   Patient Goals "to go home today"   Short Term Goal #1 GOALS UPDATED AND MOVED TO LTG #1   LTG Expiration Date 08/15/18   Long Term Goal #1 In 10 days pt will demonstrate: bed mobility (I) for home function OOB, sit<>stand and functional transfers mod (I) w/ RW vs spc WBAT R heel darco wedge toe unloading shoe for home function, gait training 100ft mod (I) w/ RW vs SPC WBAT R heel darco wedge toe unloading shoe for home distances, 2 step mod (I) w/ RW vs SPC WBAT R heel darco wedge toe unloading shoe for home entrance, improve BLE by 1/2 grade strength to optimize functional mobility, gait and stairs, improve balance by 1 grade to decrease fall risk and promote independent mobility for home, improve activity tolerance to >45 minutes w/o rest to improve functional endurance for independent home function  Treatment Day 1   Plan   Treatment/Interventions Functional transfer training;LE strengthening/ROM; Therapeutic exercise; Endurance training;Patient/family training;Equipment eval/education; Bed mobility;Gait training;Spoke to nursing;OT;Family   PT Frequency (4-5x/wk)   Recommendation   Recommendation Home PT; Home with family support   Equipment Recommended Walker  (patient refusing RW and will only use his cane at home)   PT - OK to Discharge Yes   Additional Comments Pt would optimally benefit from RW for home for optimal safety; however pt adamently refusing to take one home  Pt reports that he is comfortable going home w/ SPC and does not need to practice steps  Pt demonstrates S level with SPC  If patient is to go home recommend conitnued PT, family support, and at minimum [facility] at all times     Modified [place] Scale   Modified [place] Scale 4   Barthel Index   Feeding 10   Bathing 0   Grooming Score 5   Dressing Score 5   Bladder Score 10   Bowels Score 10   Toilet Use Score 5   Transfers (Bed/Chair) Score 10   Mobility (Level Surface) Score 0   Stairs Score 0   Barthel Index Score 55   Ashley Lou, PT,DPT

## 2018-08-05 NOTE — PLAN OF CARE
Problem: OCCUPATIONAL THERAPY ADULT  Goal: Performs self-care activities at highest level of function for planned discharge setting  See evaluation for individualized goals  Treatment Interventions: ADL retraining, Functional transfer training, UE strengthening/ROM, Endurance training, Cognitive reorientation, Patient/family training, Equipment evaluation/education, Compensatory technique education, Activityengagement, Energy conservation  Equipment Recommended:  (rolling walker)       See flowsheet documentation for full assessment, interventions and recommendations  Outcome: Progressing  Limitation: Decreased ADL status, Decreased UE strength, Decreased Safe judgement during ADL, Decreased cognition, Decreased endurance, Decreased self-care trans, Decreased high-level ADLs, Decreased sensation  Prognosis: Good  Assessment: Pt seen for OT treatment session this PM focusing on functional activity tolerance, bed mobility, ADLs, and functional mobility/transfers  Pt w/ new orders for WBAT to R heel in [facility] Wedge toe unloading shoe  Pt alert and cooperative throughout session  Pt lying supine upon entering room, requiring Supervision only for supine>sit 2* cues for technique, increased time to complete, and use of bed rails  Sit<>stand transfers completed at a Supervision level with bed height elevated 2* cues for safe hand placement during transfers (push off of surface vs pulling up on RW)  Supervision only required for functional mobility with use of RW  Min A required for transfer to [facility] 2* decreased controlled descent to surface for stand>sit and increased assist required to initiate forward weight shift from surface for stand>sit from [facility] surface  Additional functional mobility trial completed with SPC as pt reports owning SPC in home environment and declines use of RW for home environment  Min A initially required for balance with use of SPC, however pt progressed to Supervision as distance increased   ADL tasks completed while seated EOB  UB dressing completed at a Supervision level to don/[facility] gown 2* setup required and increased time to complete  LB dressing completed with Mod A  Pt able to doff B/L socks, however required assist to don B/L socks 2* decreased functional reach  Pt reports he has assist at home for LB ADLs  Light grooming tasks completed with Supervision 2* setup required and increased time to complete  Pt seated EOB at end of session with call bell and phone within reach  All needs met and pt reports no further questions for OT at this time  Please see below updated OT LTGs 2* OOB assessment completed in today's session  Recommend continued OT at this time with pt preferring home without therapies  OT to continue to follow pt on caseload        OT Discharge Recommendation: Home OT (Pt prefers home without therapy services)  OT - OK to Discharge: Yes (when medically cleared)

## 2018-08-05 NOTE — PLAN OF CARE
Problem: OCCUPATIONAL THERAPY ADULT  Goal: Performs self-care activities at highest level of function for planned discharge setting  See evaluation for individualized goals  Treatment Interventions: ADL retraining, Functional transfer training, UE strengthening/ROM, Endurance training, Cognitive reorientation, Patient/family training, Equipment evaluation/education, Compensatory technique education, Activityengagement, Energy conservation  Equipment Recommended:  (rolling walker)       See flowsheet documentation for full assessment, interventions and recommendations  Outcome: Progressing  Limitation: Decreased ADL status, Decreased UE strength, Decreased Safe judgement during ADL, Decreased endurance, Decreased self-care trans, Decreased high-level ADLs  Prognosis: Good  Assessment: Pt is a 74 y o  male seen for OT Re-evaluation s/p adm to [facility] on 7/31/2018 w/ Cellulitis of right foot   Pt with active OT orders  Pt seen for OT Re-evaluation 2* pt w/ new orders for WBAT to R heel in [facility] Wedge toe unloading shoe  Pt alert and cooperative throughout session  Please see initial evaluation on 08/03/2018 for information regarding home environment and PLOF  Upon Re-evaluation, pt currently requires Supervision for UB ADLs, Mod A for LB ADLs, Min A for toileting, and Min A-Supervision for for functional mobility/transfers 2* the following deficits impacting occupational performance: weakness, decreased strength, decreased balance and decreased tolerance  These impairments, as well at pts steps to enter environment, difficulty performing ADLS, difficulty performing IADLS  and limited insight into deficits limit pts ability to safely engage in all baseline areas of occupation, including grooming, bathing, dressing, toileting, functional mobility/transfers, community mobility, laundry , house maintenance, meal prep, cleaning, social participation  and leisure activities    Pt scored overall 55/100 on the Barthel Index  Pt to continue to benefit from continued acute OT services during hospital stay to address defined deficits and to maximize level of functional independence in the following Occupational Performance areas: grooming, bathing/shower, toilet hygiene, dressing, socialization, health maintenance, functional mobility, community mobility, clothing management, cleaning, meal prep, household maintenance, social participation and transfers to common household surfaces  From OT standpoint, recommend continued OT at this time with pt preferring home without therapies  Please see below updated OT LTGs 2* OOB assessment completed in today's session   OT will continue to follow pt 3-5x/wk to address the following goals to  w/in 10-14 days:     OT Discharge Recommendation: Home OT (Pt prefers home without therapy services)  OT - OK to Discharge: Yes (when medically cleared)

## 2018-08-05 NOTE — OCCUPATIONAL THERAPY NOTE
[address] Re-Evaluation     Patient Name: Varsha Wahl  BEYDM'O Date: 8/5/2018  Problem List  Patient Active Problem List   Diagnosis    Biventricular heart failure with reduced left ventricular function [facility]    Coronary artery disease    Diet-controlled diabetes mellitus (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    Essential hypertension    Cellulitis of right foot    Chronic multifocal osteomyelitis of right foot ([facility]    Abdominal aortic aneurysm (AAA) without rupture ([facility]    Venous insufficiency (chronic) (peripheral)     Past Medical History  Past Medical History:   Diagnosis Date    Cardiac disease     CHF (congestive heart failure) [facility]     Coronary artery disease     [facility]     Disease of thyroid gland     Fracture, humerus     GERD (gastroesophageal reflux disease)     Hypertension     Prediabetes     Wrist fracture      Past Surgical History  Past Surgical History:   Procedure Laterality Date    CORONARY ARTERY BYPASS GRAFT      HI AMPUTATION METATARSAL+TOE,SINGLE Right 8/2/2018    Procedure: RAY RESECTION FOOT;  Surgeon: Jose Sadler MD;  Location: [facility];  Service: Podiatry         08/05/18 1244   Note Type   Note type Re-eval;Progress   Restrictions/Precautions   Weight Bearing Precautions Per Order Yes   RLE Weight Bearing Per Order WBAT  (to R heel in Darco Wedge toe unloading shoe)   Braces or Orthoses Other (Comment)  (R Darco Wedge toe unloading shoe, L surgical shoe)   Other Precautions WBS; Fall Risk;Pain   Pain Assessment   Pain Assessment No/denies pain   Pain Score No Pain   Home Living   Additional Comments Please see initial evaluation on 08/03/18   Prior Function   Comments Please see initial evaluation on 08/03/18   Lifestyle   Autonomy per pt independent w/ ADLs, idependent w/ IADLs, independent w/ functional transfers and mobility w/ no AD, driving   Reciprocal Relationships significant other   Service to Others retired from the [facility] [employer] [facility] [employer] going out   Psychosocial   Psychosocial (WDL) [address]  6  Modified independent   [address] 5  [address] [facility] 5  [address] [facility] 3  Moderate Assistance   [address] 5  [address] 3  Moderate [address]  4  Minimal Assistance   Bed Mobility   Supine to Sit 5  Supervision   Additional items Assist x 1;HOB elevated; Bedrails; Increased time required;Verbal cues   Sit to Supine Unable to assess  (Pt seated EOB at end of session)   Additional Comments Pt seated EOB at end of session with call bell and phone within reach  All needs met and pt reports no further questions for OT at this time  Transfers   Sit to Stand 5  Supervision   Additional items Assist x 1;Bedrails; Increased time required;Verbal cues   Stand to Sit 5  Supervision   Additional items Assist x 1; Increased time required;Verbal cues   Toilet transfer 4  Minimal assistance   Additional items Assist x 1; Increased time required;Verbal cues; Commode  (grab bar)   Additional Comments Cues for safe technique and hand placement during transfers   Functional Mobility   Functional Mobility 5  Supervision   Additional Comments Assist x1; Min A initially required for balance with use of SPC, however pt progressed to Supervision as distance increased     Additional items Rolling walker;SPC   Balance   Static Sitting Good   Dynamic Sitting Fair +   Static Standing Fair   Dynamic Standing [address], & 5 -   Activity Tolerance   Activity Tolerance Patient tolerated treatment well;Patient limited by fatigue   Nurse Made Aware Pt appropriate to be seen per PENELOPE Andres   RUE Assessment   RUE Assessment [facility]   RUE Strength   RUE Overall Strength Within Functional Limits - able to perform ADL tasks with strength  (4/5 throughout)   LUE Assessment   LUE Assessment WFL   LUE Strength   LUE Overall Strength Within Functional Limits - able to perform ADL tasks with strength  (4/5 throughout)   Hand Function   Gross Motor Coordination Functional   Fine Motor Coordination Functional   Sensation   Light Touch Partial deficits in the RUE;Partial deficits in the LUE  (Neuropathy)   Proprioception   Proprioception No apparent deficits   Vision-Basic Assessment   Current Vision No visual deficits   Vision - Complex Assessment   Ocular Range of Motion WFL   Acuity Able to read clock/calendar on wall without difficulty   Perception   Inattention/Neglect Appears intact   Cognition   Overall Cognitive Status [facility]   Arousal/Participation Alert; Cooperative   Attention Within functional limits   Orientation Level Oriented X4   Memory Within functional limits   Following Commands Follows one step commands without difficulty   Assessment   Limitation Decreased ADL status; Decreased UE strength;Decreased Safe judgement during ADL;Decreased endurance;Decreased self-care trans;Decreased high-level ADLs   Prognosis Good   Assessment Pt is a 74 y o  male seen for OT Re-evaluation s/p adm to [facility] on 7/[facility]/2018 w/ Cellulitis of right foot   Pt with active OT orders  Pt seen for OT Re-evaluation 2* pt w/ new orders for WBAT to R heel in [facility] Wedge toe unloading shoe  Pt alert and cooperative throughout session  Please see initial evaluation on 08/03/2018 for information regarding home environment and PLOF  Upon Re-evaluation, pt currently requires Supervision for UB ADLs, Mod A for LB ADLs, Min A for toileting, and Min A-Supervision for for functional mobility/transfers 2* the following deficits impacting occupational performance: weakness, decreased strength, decreased balance and decreased tolerance   These impairments, as well at pts steps to enter environment, difficulty performing ADLS, difficulty performing IADLS  and limited insight into deficits limit pts ability to safely engage in all baseline areas of occupation, including grooming, bathing, dressing, toileting, functional mobility/transfers, community mobility, laundry , house maintenance, meal prep, cleaning, social participation  and leisure activities   Pt scored overall 55/100 on the Barthel Index  Pt to continue to benefit from continued acute OT services during hospital stay to address defined deficits and to maximize level of functional independence in the following Occupational Performance areas: grooming, bathing/shower, toilet hygiene, dressing, socialization, health maintenance, functional mobility, community mobility, clothing management, cleaning, meal prep, household maintenance, social participation and transfers to common household surfaces  From OT standpoint, recommend continued OT at this time with pt preferring home without therapies  Please see below updated OT LTGs 2* OOB assessment completed in today's session  OT will continue to follow pt 3-5x/wk to address the following goals to  w/in 10-14 days:   Goals   Patient Goals to go home today   LTG Time Frame 10-14   Long Term Goal Please see below OT LTGs   Plan   Treatment Interventions ADL retraining;Functional transfer training;UE strengthening/ROM; Endurance training;Patient/family training;Equipment evaluation/education; Compensatory technique education; Energy conservation; Activityengagement   Goal Expiration Date 18   Treatment Day 1   OT Frequency 3-5x/wk   Recommendation   OT Discharge Recommendation Home OT  (Pt prefers home without therapy services)   Equipment Recommended Other (comment)  (RW; Pt currently declining)   OT - OK to Discharge Yes  (when medically cleared)   Barthel Index   Feeding 10   Bathing 0   Grooming Score 5   Dressing Score 5   Bladder Score 10   Bowels Score 10   Toilet Use Score 5   Transfers (Bed/Chair) Score 10   Mobility (Level Surface) Score 0   Stairs Score 0   Barthel Index Score 55   Modified Luis Fernando Scale   Modified Luis Fernando Scale 4       GOALS     1) Pt will improve activity tolerance to G for min 30 min txment sessions     2) Pt will complete UB/LB dressing/self care w/ mod I using adaptive device and DME as needed     3) Pt will complete bathing w/ Mod I w/ use of AE and DME as needed     4) Pt will complete toileting w/ mod I w/ G hygiene/thoroughness using DME as needed     5) Pt will improve functional transfers to Mod I on/off all surfaces using DME as needed w/ G balance/safety      6) Pt will improve functional mobility during ADL/IADL/leisure tasks to Mod I using DME as needed w/ G balance/safety      7) Pt will participate in simulated IADL management task to increase independence to Mod I w/ G safety and endurance     8) Pt will engage in ongoing cognitive assessment w/ G participation w/ mod I to assist w/ safe d/c planning/recommendations     9) Pt will demonstrate G carryover of pt/caregiver education and training as appropriate w/ mod I w/o cues w/ good tolerance     10) Pt will demonstrate 100% carryover of energy conservation techniques w/ mod I t/o functional I/ADL/leisure tasks w/o cues s/p skilled education     11) Pt will increase UE strength by 1 MM grade to increase independence in ADLs and transfers      Rene Scruggs OTR/L

## 2018-08-05 NOTE — PROGRESS NOTES
Progress Note - Podiatry  Roberta Carroll 74 y o  male MRN: [id number]  Unit/Bed#: E4 -01 Encounter: [id number]    Assessment/Plan:  3  64yo M w R sub 5th met ulcer Kentrell Lazcano 3) with underlying cellulitis and osteomyelitis of 5th metatarsal head  - s/p right 5th met head resection (DOS 8/2/18)  - WBS: PWB to heel in [facility] wedge shoe on right  - dressing changed today, incision site appears well approximated, sutures are intact, lateral to the suture site the wound is present, measures 5 cm x 3 cm x 0 8 cm, eschar in center, no periwound erythema, appears dry and stable, applied Xeroform to surgical site and Maxorb to the wound  2  DTI Right hallux and left 3rd digit wound ([place] 1)  - dressing changed today, wounds are dry and stable  3  Hematuria  - US of kidneys and bladder reveals bladder to have thickening of right lateral wall, urology on board and will sent off a urine cytology in urine culture  4  CAD with Afib  - CABGx4 2011  5  CHF  6  DMII with neuropathy  - diet-controlled  - A1c 6 1  7  Hypothyroidism  8  HTN  9  Hyperbilirubinemia  10  PVD  - stent placed 2015    Dispo:  Patient is not medically stable for discharge  Subjective/Objective   Chief Complaint:   Chief Complaint   Patient presents with    Foot Swelling     pt c/o swelling and pain to right foot  thinks it is infected  just came back from a 9 days cruise  also states on the left foot one of the toes does not appear to be healing right from an old wound  Subjective: 74 y o  y/o male was seen and evaluated at bedside  Reports urination symptoms have improved  Still has nausea and vomiting  Denies fever and chills  Blood pressure 148/95, pulse 96, temperature (!) 97 1 °F (36 2 °C), temperature source Tympanic, resp  rate 16, height 6' 2" (1 88 m), weight 97 7 kg (215 lb 6 2 oz), SpO2 94 %  ,Body mass index is 27 65 kg/m²      Invasive Devices     Peripheral Intravenous Line            Peripheral IV 08/03/18 Left Forearm 1 day Physical Exam:   General: Alert, cooperative and no distress  Lungs: Non labored breathing  Heart: Positive S1, S2  Abdomen: Soft, non-tender  Extremity:   MSK function and NVS at baseline  Incision site appears well approximated, sutures are intact, lateral to the suture site the wound is present, measures 5 cm x 3 cm x 0 8 cm, eschar in center, no periwound erythema, appears dry and stable                      Lab, Imaging and other studies:   I have personally reviewed pertinent lab results  Imaging: I have personally reviewed pertinent films in PACS  EKG, Pathology, and Other Studies: I have personally reviewed pertinent reports    VTE Pharmacologic Prophylaxis: Heparin

## 2018-08-05 NOTE — OCCUPATIONAL THERAPY NOTE
[address] Evaluation     Patient Name: Zoran Luke  CCS'D Date: 8/5/2018  Problem List  Patient Active Problem List   Diagnosis    Biventricular heart failure with reduced left ventricular function [facility]    Coronary artery disease    Diet-controlled diabetes mellitus ([facility]    Edema    Hyperbilirubinemia    Hypothyroidism    Neuropathy of both feet    Atherosclerosis of native artery of right lower extremity with ulceration ([facility]    Essential hypertension    Cellulitis of right foot    Chronic multifocal osteomyelitis of right foot ([facility]    Abdominal aortic aneurysm (AAA) without rupture ([facility]    Venous insufficiency (chronic) (peripheral)     Past Medical History  Past Medical History:   Diagnosis Date    Cardiac disease     CHF (congestive heart failure) ([facility]     Coronary artery disease     Diabetes mellitus ([facility]     Disease of thyroid gland     Fracture, humerus     GERD (gastroesophageal reflux disease)     Hypertension     Prediabetes     Wrist fracture      Past Surgical History  Past Surgical History:   Procedure Laterality Date    CORONARY ARTERY BYPASS GRAFT      PA AMPUTATION METATARSAL+TOE,SINGLE Right 8/2/2018    Procedure: RAY RESECTION FOOT;  Surgeon: Eveline Limon MD;  Location: [facility];  Service: Podiatry         08/05/18 1405   Restrictions/Precautions   Weight Bearing Precautions Per Order Yes   RLE Weight Bearing Per Order WBAT  (to R heel in Darco Wedge toe unloading shoe)   Braces or Orthoses Other (Comment)  (R Darco Wedge toe unloading shoe, L surgical shoe)   Other Precautions WBS; Fall Risk;Pain   Lifestyle   Autonomy per pt independent w/ ADLs, idependent w/ IADLs, independent w/ functional transfers and mobility w/ no AD, driving   Reciprocal Relationships significant other   Service to Others retired from the [employer] going out   Pain Assessment   Pain Assessment No/denies pain   Pain Score No Pain   ADL   Grooming Assistance 5  Supervision/Setup   Grooming Deficit Setup;Supervision/safety; Increased time to complete   Grooming Comments Light grooming tasks completed with Supervision 2* setup required and increased time to complete  UB Dressing Assistance 5  Supervision/Setup   UB Dressing Deficit Setup;Supervision/safety; Increased time to complete   UB Dressing Comments UB dressing completed at a Supervision level to don/doff hospital gown 2* setup required and increased time to complete  LB Dressing Assistance 3  Moderate Assistance   LB Dressing Deficit Setup;Verbal cueing;Supervision/safety; Increased time to complete; Don/doff R sock; Don/doff L sock   LB Dressing Comments LB dressing completed with Mod A  Pt able to doff B/L socks, however required assist to don B/L socks 2* decreased functional reach  Pt reports he has assist at home for LB ADLs  Functional Standing Tolerance   Time 5 mins   Activity Functional mobility   Comments No LOB noted   Bed Mobility   Supine to Sit 5  Supervision   Additional items Assist x 1;HOB elevated; Bedrails; Increased time required;Verbal cues   Sit to Supine Unable to assess  (Pt seated EOB at end of session)   Additional Comments Pt seated EOB at end of session with call bell and phone within reach  All needs met and pt reports no further questions for OT at this time  Transfers   Sit to Stand 5  Supervision   Additional items Assist x 1;Bedrails; Increased time required;Verbal cues   Stand to Sit 5  Supervision   Additional items Assist x 1; Increased time required;Verbal cues   Toilet transfer 4  Minimal assistance   Additional items Assist x 1; Increased time required;Verbal cues; Commode  (grab bar)   Additional Comments Cues for safe technique and hand placement during transfers   Functional Mobility   Functional Mobility 5  Supervision   Additional Comments Assist x1; Min A initially required for balance with use of SPC, however pt progressed to Supervision as distance increased  Additional items Rolling walker;SPC   Toilet Transfers   Toilet Transfer From Rolling walker   Toilet Transfer Type To and from   Toilet Transfer to Standard bedside commode   Toilet Transfer Technique Stand pivot; Ambulating   Toilet Transfers Minimal assistance   Toilet Transfers Comments Assist x1 w/ use of grab bar; Min A required for transfer to [facility] 2* decreased controlled descent to surface for stand>sit and increased assist required to initiate forward weight shift from surface for stand>sit from [facility] surface  Cognition   Overall Cognitive Status WFL   Arousal/Participation Alert; Cooperative   Attention Within functional limits   Orientation Level Oriented X4   Memory Within functional limits   Following Commands Follows one step commands without difficulty   Activity Tolerance   Activity Tolerance Patient tolerated treatment well;Patient limited by fatigue   Medical Staff Made Aware Pt appropriate to be seen per PENELOPE Shaikh   Assessment   Assessment Pt seen for OT treatment session this PM focusing on functional activity tolerance, bed mobility, ADLs, and functional mobility/transfers  Pt w/ new orders for WBAT to R heel in [facility] Wedge toe unloading shoe  Pt alert and cooperative throughout session  Pt lying supine upon entering room, requiring Supervision only for supine>sit 2* cues for technique, increased time to complete, and use of bed rails  Sit<>stand transfers completed at a Supervision level with bed height elevated 2* cues for safe hand placement during transfers (push off of surface vs pulling up on RW)  Supervision only required for functional mobility with use of RW  Min A required for transfer to [facility] 2* decreased controlled descent to surface for stand>sit and increased assist required to initiate forward weight shift from surface for stand>sit from [facility] surface   Additional functional mobility trial completed with SPC as pt reports owning SPC in home environment and declines use of RW for home environment  Min A initially required for balance with use of SPC, however pt progressed to Supervision as distance increased  ADL tasks completed while seated EOB  UB dressing completed at a Supervision level to don/[facility] 2* setup required and increased time to complete  LB dressing completed with Mod A  Pt able to doff B/L socks, however required assist to don B/L socks 2* decreased functional reach  Pt reports he has assist at home for LB ADLs  Light grooming tasks completed with Supervision 2* setup required and increased time to complete  Pt seated EOB at end of session with call bell and phone within reach  All needs met and pt reports no further questions for OT at this time  Please see below updated OT LTGs 2* OOB assessment completed in today's session  Recommend continued OT at this time with pt preferring home without therapies  OT to continue to follow pt on caseload  Plan   Treatment Interventions ADL retraining;Functional transfer training;UE strengthening/ROM; Endurance training;Patient/family training;Equipment evaluation/education; Compensatory technique education; Energy conservation; Activityengagement   Goal Expiration Date 08/17/18   Treatment Day 1   OT Frequency 3-5x/wk   Recommendation   OT Discharge Recommendation Home OT  (Pt prefers home without therapy services)   Equipment Recommended Other (comment)  (RW; Pt currently declining)   OT - OK to Discharge Yes  (when medically cleared)   Barthel Index   Feeding 10   Bathing 0   Grooming Score 5   Dressing Score 5   Bladder Score 10   Bowels Score 10   Toilet Use Score 5   Transfers (Bed/Chair) Score 10   Mobility (Level Surface) Score 0   Stairs Score 0   Barthel Index Score 55   Modified Orick Scale   Modified Luis Fernando Scale 4        GOALS    1) Pt will improve activity tolerance to G for min 30 min txment sessions    2) Pt will complete UB/LB dressing/self care w/ mod I using adaptive device and DME as needed    3) Pt will complete bathing w/ Mod I w/ use of AE and DME as needed    4) Pt will complete toileting w/ mod I w/ G hygiene/thoroughness using DME as needed    5) Pt will improve functional transfers to Mod I on/off all surfaces using DME as needed w/ G balance/safety     6) Pt will improve functional mobility during ADL/IADL/leisure tasks to Mod I using DME as needed w/ G balance/safety     7) Pt will participate in simulated IADL management task to increase independence to Mod I w/ G safety and endurance    8) Pt will engage in ongoing cognitive assessment w/ G participation w/ mod I to assist w/ safe d/c planning/recommendations    9) Pt will demonstrate G carryover of pt/caregiver education and training as appropriate w/ mod I w/o cues w/ good tolerance    10) Pt will demonstrate 100% carryover of energy conservation techniques w/ mod I t/o functional I/ADL/leisure tasks w/o cues s/p skilled education     11) Pt will increase UE strength by 1 MM grade to increase independence in ADLs and transfers      Belinda Paris OTR/L

## 2018-08-06 PROBLEM — R31.29 MICROSCOPIC HEMATURIA: Status: ACTIVE | Noted: 2018-08-06

## 2018-08-06 LAB
ANION GAP SERPL CALCULATED.3IONS-SCNC: 7 MMOL/L (ref 4–13)
BACTERIA UR CULT: NORMAL
BUN SERPL-MCNC: 20 MG/DL (ref 5–25)
CALCIUM SERPL-MCNC: 8.4 MG/DL (ref 8.3–10.1)
CHLORIDE SERPL-SCNC: 98 MMOL/L (ref 100–108)
CO2 SERPL-SCNC: 30 MMOL/L (ref 21–32)
CREAT SERPL-MCNC: 0.99 MG/DL (ref 0.6–1.3)
ERYTHROCYTE [DISTWIDTH] IN BLOOD BY AUTOMATED COUNT: 15.8 % (ref 11.6–15.1)
GFR SERPL CREATININE-BSD FRML MDRD: 75 ML/MIN/1.73SQ M
GLUCOSE SERPL-MCNC: 102 MG/DL (ref 65–140)
HCT VFR BLD AUTO: 49.5 % (ref 36.5–49.3)
HGB BLD-MCNC: 17.1 G/DL (ref 12–17)
MCH RBC QN AUTO: 34.8 PG (ref 26.8–34.3)
MCHC RBC AUTO-ENTMCNC: 34.5 G/DL (ref 31.4–37.4)
MCV RBC AUTO: 101 FL (ref 82–98)
PLATELET # BLD AUTO: 187 THOUSANDS/UL (ref 149–390)
PMV BLD AUTO: 10.2 FL (ref 8.9–12.7)
POTASSIUM SERPL-SCNC: 3.6 MMOL/L (ref 3.5–5.3)
PREALB SERPL-MCNC: 14.8 MG/DL (ref 18–40)
RBC # BLD AUTO: 4.91 MILLION/UL (ref 3.88–5.62)
SODIUM SERPL-SCNC: 135 MMOL/L (ref 136–145)
WBC # BLD AUTO: 9.99 THOUSAND/UL (ref 4.31–10.16)

## 2018-08-06 PROCEDURE — 85027 COMPLETE CBC AUTOMATED: CPT | Performed by: STUDENT IN AN ORGANIZED HEALTH CARE EDUCATION/TRAINING PROGRAM

## 2018-08-06 PROCEDURE — 0JBQ0ZZ EXCISION OF RIGHT FOOT SUBCUTANEOUS TISSUE AND FASCIA, OPEN APPROACH: ICD-10-PCS | Performed by: PODIATRIST

## 2018-08-06 PROCEDURE — 97116 GAIT TRAINING THERAPY: CPT

## 2018-08-06 PROCEDURE — 99232 SBSQ HOSP IP/OBS MODERATE 35: CPT | Performed by: PHYSICIAN ASSISTANT

## 2018-08-06 PROCEDURE — 80048 BASIC METABOLIC PNL TOTAL CA: CPT | Performed by: STUDENT IN AN ORGANIZED HEALTH CARE EDUCATION/TRAINING PROGRAM

## 2018-08-06 PROCEDURE — 84134 ASSAY OF PREALBUMIN: CPT | Performed by: PODIATRIST

## 2018-08-06 PROCEDURE — 97530 THERAPEUTIC ACTIVITIES: CPT

## 2018-08-06 PROCEDURE — 99232 SBSQ HOSP IP/OBS MODERATE 35: CPT | Performed by: HOSPITALIST

## 2018-08-06 RX ADMIN — EPLERENONE 25 MG: 25 TABLET, FILM COATED ORAL at 10:45

## 2018-08-06 RX ADMIN — Medication 3.38 G: at 16:02

## 2018-08-06 RX ADMIN — SODIUM CHLORIDE 125 ML/HR: 0.9 INJECTION, SOLUTION INTRAVENOUS at 10:52

## 2018-08-06 RX ADMIN — METOLAZONE 2.5 MG: 2.5 TABLET ORAL at 10:45

## 2018-08-06 RX ADMIN — Medication 250 MG: at 17:43

## 2018-08-06 RX ADMIN — Medication 3.38 G: at 05:10

## 2018-08-06 RX ADMIN — Medication 250 MG: at 10:45

## 2018-08-06 RX ADMIN — MELATONIN TAB 3 MG 3 MG: 3 TAB at 21:26

## 2018-08-06 RX ADMIN — ALLOPURINOL 300 MG: 100 TABLET ORAL at 10:46

## 2018-08-06 RX ADMIN — METOPROLOL SUCCINATE 25 MG: 25 TABLET, EXTENDED RELEASE ORAL at 10:46

## 2018-08-06 RX ADMIN — COLCHICINE 0.6 MG: 0.6 TABLET, FILM COATED ORAL at 10:46

## 2018-08-06 RX ADMIN — GABAPENTIN 300 MG: 300 CAPSULE ORAL at 21:26

## 2018-08-06 RX ADMIN — GABAPENTIN 300 MG: 300 CAPSULE ORAL at 10:46

## 2018-08-06 RX ADMIN — FUROSEMIDE 40 MG: 40 TABLET ORAL at 10:46

## 2018-08-06 RX ADMIN — GABAPENTIN 300 MG: 300 CAPSULE ORAL at 17:43

## 2018-08-06 RX ADMIN — DABIGATRAN ETEXILATE MESYLATE 150 MG: 150 CAPSULE ORAL at 10:45

## 2018-08-06 RX ADMIN — LEVOTHYROXINE SODIUM 50 MCG: 50 TABLET ORAL at 05:10

## 2018-08-06 RX ADMIN — Medication 3.38 G: at 08:35

## 2018-08-06 RX ADMIN — Medication 500 MG: at 10:45

## 2018-08-06 RX ADMIN — ONDANSETRON 4 MG: 2 INJECTION INTRAMUSCULAR; INTRAVENOUS at 09:31

## 2018-08-06 RX ADMIN — Medication 3.38 G: at 21:28

## 2018-08-06 RX ADMIN — DABIGATRAN ETEXILATE MESYLATE 150 MG: 150 CAPSULE ORAL at 21:25

## 2018-08-06 RX ADMIN — SODIUM CHLORIDE 125 ML/HR: 0.9 INJECTION, SOLUTION INTRAVENOUS at 19:37

## 2018-08-06 NOTE — PLAN OF CARE
Problem: GASTROINTESTINAL - ADULT  Goal: Minimal or absence of nausea and/or vomiting  INTERVENTIONS:  - Administer IV fluids as ordered to ensure adequate hydration  - Administer ordered antiemetic medications as needed  - Provide nonpharmacologic comfort measures as appropriate  - Advance diet as tolerated  - Nutrition services referral to assist patient with adequate nutrition and appropriate food choices   Outcome: Progressing      Problem: SKIN/TISSUE INTEGRITY - ADULT  Goal: Incision(s), wounds(s) or drain site(s) healing without S/S of infection  INTERVENTIONS  - Assess and document risk factors for skin impairment   - Assess and document dressing, incision, wound bed, drain sites and surrounding tissue  - Initiate Nutrition services consult and/or wound management as needed   Outcome: Progressing      Problem: DISCHARGE PLANNING - CARE MANAGEMENT  Goal: Discharge to post-acute care or home with appropriate resources  INTERVENTIONS:  - Conduct assessment to determine patient/family and health care team treatment goals, and need for post-acute services based on payer coverage, community resources, and patient preferences, and barriers to discharge  - Address psychosocial, clinical, and financial barriers to discharge as identified in assessment in conjunction with the patient/family and health care team  - Arrange appropriate level of post-acute services according to patients   needs and preference and payer coverage in collaboration with the physician and health care team  - Communicate with and update the patient/family, physician, and health care team regarding progress on the discharge plan  - Arrange appropriate transportation to post-acute venues   Outcome: Progressing      Problem: Prexisting or High Potential for Compromised Skin Integrity  Goal: Skin integrity is maintained or improved  INTERVENTIONS:  - Identify patients at risk for skin breakdown  - Assess and monitor skin integrity  - Assess and monitor nutrition and hydration status  - Monitor labs (i e  albumin)  - Assess for incontinence   - Turn and reposition patient  - Assist with mobility/ambulation  - Relieve pressure over bony prominences  - Avoid friction and shearing  - Provide appropriate hygiene as needed including keeping skin clean and dry  - Evaluate need for skin moisturizer/barrier cream  - Collaborate with interdisciplinary team (i e  Nutrition, Rehabilitation, etc )   - Patient/family teaching   Outcome: Progressing      Problem: Potential for Falls  Goal: Patient will remain free of falls  INTERVENTIONS:  - Assess patient frequently for physical needs  -  Identify cognitive and physical deficits and behaviors that affect risk of falls    -  Blanco fall precautions as indicated by assessment   - Educate patient/family on patient safety including physical limitations  - Instruct patient to call for assistance with activity based on assessment  - Modify environment to reduce risk of injury  - Consider OT/PT consult to assist with strengthening/mobility   Outcome: Progressing

## 2018-08-06 NOTE — PHYSICAL THERAPY NOTE
Physical Therapy Progress Note     08/06/18 1028   Pain Assessment   Pain Assessment 0-10   Pain Score 5   Pain Type Chronic pain   Pain Location Abdomen   Hospital Pain Intervention(s) Ambulation/increased activity;Repositioned   Response to Interventions Tolerated  Restrictions/Precautions   Weight Bearing Precautions Per Order Yes   RLE Weight Bearing Per Order WBAT  (R heel darco wedge shoe)   Braces or Orthoses Other (Comment)  (darco wedge toe unloading)   Other Precautions WBS; Fall Risk;Multiple lines;Pain   General   Chart Reviewed Yes   Response to Previous Treatment Patient reporting fatigue but able to participate  Family/Caregiver Present Yes  (Pt's significant other present during treatment session )   Subjective   Subjective Willing to participate in therapy this PM    Bed Mobility   Supine to Sit 5  Supervision   Additional items Assist x 1;HOB elevated; Bedrails; Increased time required;Verbal cues;LE management   Transfers   Sit to Stand 4  Minimal assistance   Additional items Assist x 1;Bedrails; Increased time required;Verbal cues   Stand to Sit 4  Minimal assistance   Additional items Assist x 1;Bedrails; Increased time required;Verbal cues   Toilet transfer 4  Minimal assistance   Additional items Assist x 1; Armrests; Increased time required;Verbal cues; Commode;Raised toilet seat  (use of grab bar)   Ambulation/Elevation   Gait pattern Decreased foot clearance; Forward Flexion; Antalgic; Improper Weight shift;Decreased R stance; Short stride; Step to;Excessively slow   Gait Assistance 4  Minimal assist   Additional items Assist x 1;Verbal cues; Tactile cues   Assistive Device Rolling walker   Distance 15' x 2 with seated toileting break in between   Stair Management Assistance Not tested  (Pt  continues to deny stair training at this time )   Balance   Static Sitting Good   Dynamic Sitting Fair +   Static Standing Fair   Dynamic Standing [address] Deficit Yes   Endurance Deficit Description fatigue/pain   Activity Tolerance   Activity Tolerance Patient limited by pain; Patient limited by fatigue   Nurse Made Aware Yes   Assessment   Prognosis Fair   Problem List Decreased strength;Decreased range of motion;Decreased endurance; Impaired balance;Decreased mobility; Decreased skin integrity;Orthopedic restrictions;Pain; Impaired judgement;Decreased safety awareness   Assessment Pt  supine in bed upon my arrival  Pt  continues to remain apprehensive during therapeutic intervention with several explanations required for reasoning of therapeutic intervention benefit prior to participation of pt  Progressed with transfers continuing to require A of theraipst with cues provided for proper technique  Pt  continues to defer use of RW and wished to attempt amb  trial with use of SPC, however pt  remains at an increased risk for falls with use of SPC  Pt  agreeable to use of RW for limited amb  trial  Pt  requested to use br, required A to and from elevated commode seat in br  Pt  deferred increased amb  trial requesting to return to bed  Discussion of stair training, however pt  continues to refuse at this time  Pt  remained seated at EOB at end of treatment session  PT will continue to recommend use of RW for amb  trials at home for increased support/balance  PT will recommend as well return home with HHPT and family support as needed when medically stable  Barriers to Discharge Inaccessible home environment   Barriers to Discharge Comments AJIT   Goals   Patient Goals To go home  LTG Expiration Date 08/15/18   Treatment Day 2   Plan   Treatment/Interventions Functional transfer training;LE strengthening/ROM; Endurance training;Bed mobility;Gait training;Spoke to nursing;Spoke to case management; Family   Progress Slow progress, multiple refusals   PT Frequency Other (Comment)  (4-5x/wk)   Recommendation   Recommendation Home PT; Home with family support   Equipment Recommended Walker  (RW; patient continues to refuse use of RW at home)     Matty Castillo, PTA

## 2018-08-06 NOTE — PROGRESS NOTES
Progress Note - Podiatry  Sanjuana Hendricks 74 y o  male MRN: [id number]  Unit/Bed#: E4 -01 Encounter: [id number]    Assessment/Plan:   3  75 y/o male with s/p Right partial 5th met head resection (DOS: 8/2/18) and wound on the plantar aspect of the lateral forefoot   -wound was excisionally debrided with 15 blade and  down to subcutaneous level to remove all non viable soft tissue (~15cm2)  Unfortunately wound looks boggie and would benefit from further debridement however he is refusing any more surgical procedures at this time  Dr Deborah Chapa to round on the patient tomorrow and discuss further plan  -5th met head clean margin results pending; spoke to pathology to expedite the results   -c/w zosyn per ID for now   -c/w WBAT to the heel with [facility] wedge shoe  -PT/OT recommends d/c home once stable    2  DTI to the right hallux and left 3rd digit wound (tavares stage 1)  -dry and stable, applied betadine paint     3  Hematuria   -urology onboard     4  CAD with Afib   -on pradaxa     5  Diabetes mellitus type 2, neuropathy- A1c 6 1%   -diet controlled   -appreciate management per SLIM    6  Hypothyroidism  -c/w levothyroxine     7  HTN  -c/w metoprolol    8  PAD  -s/p stent placement 2015   -LEADs from 7/31 reviewed; R GTP 51mmHg and L GTP 0mmHg; patient to follow up with his vascular doctor upon discharge     9  Diarrhea   -resolved, C diff results negative     Dispo: will await clean margin results         Subjective/Objective   Chief Complaint:   Chief Complaint   Patient presents with    Foot Swelling     pt c/o swelling and pain to right foot  thinks it is infected  just came back from a 9 days cruise  also states on the left foot one of the toes does not appear to be healing right from an old wound  Subjective: 74 y o  y/o male was seen and evaluated at bedside  Patient states he is homesick and would like to go home  Denies any constitutional symptoms         Blood pressure 127/78, pulse 80, temperature 98 1 °F (36 7 °C), temperature source Tympanic, resp  rate 18, height 6' 2" (1 88 m), weight 97 7 kg (215 lb 6 2 oz), SpO2 95 %  ,Body mass index is 27 65 kg/m²  Invasive Devices     Peripheral Intravenous Line            Peripheral IV 08/03/18 Left Forearm 2 days                Physical Exam:   General: Alert, cooperative and no distress  Lungs: Non labored breathing  Heart: Positive S1, S2  Abdomen: Soft, non-tender  Extremity: NVS and motor function at baseline to b/l LE    LLE: eschars to the great toe and 3rd digit are dry and stable without acute signs of infection  RLE: Incision to the dorsal 5th ray area well coapting with sutures intact  However the wound present on the plantar medial area measures 4cm x2cm with most plantar area probing 0 6cm to soft tissue  Wound base is 50% necrotic, 50% fibrogranular in nature  No purulence  However the wound base is boggy  No malodor  Right plantar lateral foot                 Lab, Imaging and other studies:   I have personally reviewed pertinent lab results  , CBC:   Lab Results   Component Value Date    WBC 9 99 08/06/2018    HGB 17 1 (H) 08/06/2018    HCT 49 5 (H) 08/06/2018     (H) 08/06/2018     08/06/2018    MCH 34 8 (H) 08/06/2018    MCHC 34 5 08/06/2018    RDW 15 8 (H) 08/06/2018    MPV 10 2 08/06/2018   , CMP:   Lab Results   Component Value Date     (L) 08/06/2018    K 3 6 08/06/2018    CL 98 (L) 08/06/2018    CO2 30 08/06/2018    ANIONGAP 7 08/06/2018    BUN 20 08/06/2018    CREATININE 0 99 08/06/2018    GLUCOSE 102 08/06/2018    CALCIUM 8 4 08/06/2018    EGFR 75 08/06/2018       Imaging: I have personally reviewed pertinent films in PACS  EKG, Pathology, and Other Studies: I have personally reviewed pertinent reports    VTE Pharmacologic Prophylaxis: Heparin

## 2018-08-06 NOTE — PROGRESS NOTES
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 Wound boggy  Patient refuses any further debridement  ? Infectious Disease consulted, recommending to continue with IV Zosyn  ? Cultures growing Pseudomonas and Enterococcus,  ? Further management as per podiatry  ? Awaiting clean margin results     · CAD status post CABG  ? Patient without any active chest pain, dyspnea or palpitations     · Atrial fibrillation  ? Restart Pradaxa as per Cardiology recommendation  ? continue with metoprolol for rate control     · History of CHF-unspecified  ? Unclear whether this is systolic or diastolic, patient maintained on Lasix at home, continue     · Diabetes mellitus  ? Diet controlled, monitor Accu-Cheks, sliding scale for coverage  ? HbA1c 6 1  ? Blood sugars well controlled     · Hypothyroidism  ? Continue with levothyroxine     · Hypertension  ? Continue with metoprolol     · Hyperlipidemia  ? Patient states he does not have history of hyperlipidemia and states he is allergic to statin     · History of peripheral vascular disease  ? Vascular surgery consulted  ?  Continue with Pradaxa, no further vascular intervention at this time     Diarrhea-resolved        -C diff ruled out, possibly secondary to antibiotics  -colchicine decreased     Hematuria  -confirm with urinalysis, unclear etiology at this point  -renal bladder ultrasound reveals focal lobular thickening of posterior right bladder wall, no hydronephrosis  -hemoglobin stable  -urology consult stating to monitor voiding pattern, urine cytology and urine culture  Can be followed up as an outpatient  -patient may need cystoscopy at some point which could be done as an outpatient      Discussed with Podiatry resident     VTE Pharmacologic Prophylaxis:   Pharmacologic:  Pradaxa      Subjective:  Awaiting clean margin results  The partial 5th metatarsal head resection wound looks boggy and he has been recommended for the debridement which he refuses for now  Patient on Pradaxa which will need to be held for 24-48 hours prior to any surgical intervention  Physical Exam:   Vitals: Blood pressure 127/78, pulse 80, temperature 98 1 °F (36 7 °C), temperature source Tympanic, resp  rate 18, height 6' 2" (1 88 m), weight 97 7 kg (215 lb 6 2 oz), SpO2 95 %  ,Body mass index is 27 65 kg/m²  Gen:  Pleasant, non-tachypnic, non-dyspnic  Conversant  Heart: regular rate and rhythm, S1S2 present, no murmur, rub or gallop  Lungs: clear to ausculatation bilaterally  No wheezing, crackless, or rhonchi  No accessory muscle use or respiratory distress  Abd: soft, non-tender, non-distended  NABS, no guarding, rebound or peritoneal signs  Extremities:  Right foot dressed  Neuro: awake, alert and oriented  Cranial nerves 2-12 intact  Strength and sensation grossly intact  Skin: warm and dry: no petechiae, purpura and rash      LABS:     Results from last 7 days  Lab Units 08/06/18  0505 08/05/18  0600 08/04/18  0517   WBC Thousand/uL 9 99 9 45 10 93*   HEMOGLOBIN g/dL 17 1* 17 6* 17 5*   HEMATOCRIT % 49 5* 50 8* 50 6*   PLATELETS Thousands/uL 187 165 157       Results from last 7 days  Lab Units 08/06/18  0505 08/05/18  0600 08/04/18  0517   SODIUM mmol/L 135* 134* 137   POTASSIUM mmol/L 3 6 3 8 3 6   CHLORIDE mmol/L 98* 99* 99*   CO2 mmol/L 30 30 27   BUN mg/dL 20 23 22   CREATININE mg/dL 0  99 0 99 1 06   GLUCOSE RANDOM mg/dL 102 145* 144*   CALCIUM mg/dL 8 4 8 9 9 1       Intake/Output Summary (Last 24 hours) at 08/06/18 1211  Last data filed at 08/05/18 1328   Gross per 24 hour   Intake                0 ml   Output              240 ml   Net             -240 ml           Current Facility-Administered Medications:  acetaminophen 650 mg Oral Q6H PRN Berkshire Greek, DPM    allopurinol 300 mg Oral Daily Yong Ribeiro, JOSEM    colchicine 0 6 mg Oral Daily Opal Sterling MD    dabigatran etexilate 150 mg Oral Q12H Eliane Padilla MD    eplerenone 25 mg Oral Daily Yong Urdu, DPM    furosemide 40 mg Oral Daily Yong Greek, DPM    gabapentin 300 mg Oral TID Berkshiremax Ribeiro, DPM    levothyroxine 50 mcg Oral Early Morning Berkshiremax Ribeiro, DPM    magnesium gluconate 500 mg Oral Daily Yongmax Ribeiro, DPM    melatonin 3 mg Oral HS Opal Sterling MD    metolazone 2 5 mg Oral Once per day on Mon Wed Fri Anita Rivera DPM    metoprolol 5 mg Intravenous Q4H PRN Yu Salvador PA-C    metoprolol succinate 25 mg Oral Daily Yu Salvador PA-C    ondansetron 4 mg Intravenous Q6H PRN Rich Moura, DPEMMA    oxyCODONE-acetaminophen 1 tablet Oral Q4H PRN Cristobal Martin DPM    oxyCODONE-acetaminophen 2 tablet Oral Q4H PRN Cristobal Martin DPEMMA    piperacillin-tazobactam 3 375 g Intravenous Q6H Meme Aldea, DO Last Rate: 3 375 g (08/06/18 0835)   saccharomyces boulardii 250 mg Oral BID Aydee Arias, DPM    sodium chloride 125 mL/hr Intravenous Continuous Pamalee Brennan, DO Last Rate: 125 mL/hr (08/06/18 1052)

## 2018-08-06 NOTE — SOCIAL WORK
Met with pt and spouse to discuss PT recommendation  They are agreeable to [facility] for RN and PT

## 2018-08-06 NOTE — ASSESSMENT & PLAN NOTE
Microscopic hematuria  Urine cytology, urine culture pending  Jarrell now out, continuing to void on his own without issue or symptom  Ultrasound with focal bladder wall thickening  Plan:  Await cytology and culture  Patient is asymptomatic without need for any treatment at this time  Recommend outpatient CT and cystoscopy for further upper tract imaging and direct visualization of his bladder given this microscopic hematuria as an inpatient

## 2018-08-06 NOTE — PLAN OF CARE

## 2018-08-06 NOTE — CASE MANAGEMENT
Continued Stay Review    Date: 8/4/2018    Vital Signs:     Medications:   Scheduled Meds:   Current Facility-Administered Medications:          allopurinol 300 mg Oral Daily Claressa Harris, DPM    colchicine 0 6 mg Oral Daily Florentin Wong MD    dabigatran etexilate 150 mg Oral Q12H Allyn Chacon MD    eplerenone 25 mg Oral Daily Claressa Harris, DPM    furosemide 40 mg Oral Daily Claressa Harris, DPM    gabapentin 300 mg Oral TID Claressa Harris, DPM    levothyroxine 50 mcg Oral Early Morning Claressa Harris, DPM    magnesium gluconate 500 mg Oral Daily Claressa Harris, DPM    melatonin 3 mg Oral HS Florentin Wong MD    metolazone 2 5 mg Oral Once per day on Mon Wed Fri Anita Nicole, DPM            metoprolol succinate 25 mg Oral Daily Flavio Florez PA-C                            piperacillin-tazobactam 3 375 g Intravenous Q6H Meme Lutz DO Last Rate: 3 375 g (08/06/18 0835)   saccharomyces boulardii 250 mg Oral BID Nonda Snow, DPM              Continuous Infusions:   sodium chloride 125 mL/hr Last Rate: 125 mL/hr (08/06/18 1052)     PRN Meds:   acetaminophen    metoprolol    ondansetron    oxyCODONE-acetaminophen    oxyCODONE-acetaminophen    Abnormal Labs/Diagnostic Results:  Wbc's 10 93,   H&H 17 5 / 50 6,   Cl 99,   Glu 144    Age/Sex: 74 y o  male   C/O  abdominal soreness, occasional nausea and nonbloody nonbilious vomiting  Also complaining of having hematuria    Assessment/Plan:   Cellulitis of right foot  Active Problems:    Diet-controlled diabetes mellitus ([facility] [address] )    Atherosclerosis of native artery of right lower extremity with ulceration (HCC)    Chronic multifocal osteomyelitis of right foot (HCC)    Abdominal aortic aneurysm (AAA) without rupture (HCC)    Venous insufficiency (chronic) (peripheral)     Plan:  · Right plantar 5th metatarsal ulcer and distal left 3rd toe ulcer  ? MRI suggestive of osteomyelitis of 5th metatarsal head and suggestive of associated septic arthritis  ?  Infectious Disease consulted, recommending to continue with IV Zosyn  ? Cultures growing Pseudomonas and Enterococcus,  ? Further management as per podiatry     · CAD status post CABG  ? Patient not have any active chest pain, dyspnea or palpitations     · Atrial fibrillation  ? Restart Pradaxa as per Cardiology recommendation  ? continue with metoprolol for rate control     · History of CHF-unspecified  ? Unclear whether this is systolic or diastolic, patient maintained on Lasix at home, continue     · Diabetes mellitus  ? Diet controlled, monitor Accu-Cheks, sliding scale for coverage  ? HbA1c 6 1  ? Blood sugars well controlled     · Hypothyroidism  ? Continue with levothyroxine     · Hypertension  ? Continue with metoprolol     · Hyperlipidemia  ? Patient states he does not have history of hyperlipidemia and states he is allergic to statin     · History of peripheral vascular disease  ? Vascular surgery consulted  ? Continue with Pradaxa, no further vascular intervention at this time     Diarrhea        -C diff ruled out, possibly secondary to antibiotics  -colchicine decreased     Hematuria  -confirm with urinalysis, unclear etiology at this point  -renal bladder ultrasound ordered  -bladder scan  -recommend urology consultation for further evaluation    VTE Pharmacologic Prophylaxis:   Pharmacologic:  Pradaxa currently on hold due to hematuria       Discharge Plan:   To be drtermined

## 2018-08-06 NOTE — PLAN OF CARE
Problem: PHYSICAL THERAPY ADULT  Goal: Performs mobility at highest level of function for planned discharge setting  See evaluation for individualized goals  Treatment/Interventions: Functional transfer training, LE strengthening/ROM, Therapeutic exercise, Endurance training, Patient/family training, Equipment eval/education, Bed mobility, Gait training, Spoke to nursing, OT, Family  Equipment Recommended: Wai Frye (patient refusing RW and will only use his cane at home)       See flowsheet documentation for full assessment, interventions and recommendations  Outcome: Progressing  Prognosis: Fair  Problem List: Decreased strength, Decreased range of motion, Decreased endurance, Impaired balance, Decreased mobility, Decreased skin integrity, Orthopedic restrictions, Pain, Impaired judgement, Decreased safety awareness  Assessment: Pt  supine in bed upon my arrival  Pt  continues to remain apprehensive during therapeutic intervention with several explanations required for reasoning of therapeutic intervention benefit prior to participation of pt  Progressed with transfers continuing to require A of theraipst with cues provided for proper technique  Pt  continues to defer use of RW and wished to attempt amb  trial with use of SPC, however pt  remains at an increased risk for falls with use of SPC  Pt  agreeable to use of RW for limited amb  trial  Pt  requested to use br, required A to and from elevated commode seat in br  Pt  deferred increased amb  trial requesting to return to bed  Discussion of stair training, however pt  continues to refuse at this time  Pt  remained seated at EOB at end of treatment session  PT will continue to recommend use of RW for amb  trials at home for increased support/balance  PT will recommend as well return home with HHPT and family support as needed when medically stable     Barriers to Discharge: Inaccessible home environment  Barriers to Discharge Comments: AJIT  Recommendation: Home PT, Home with family support     PT - OK to Discharge: Yes    See flowsheet documentation for full assessment

## 2018-08-06 NOTE — PROGRESS NOTES
Progress Note - Urology  [facility] 1945, 74 y o  male MRN: [id number]    Unit/Bed#: E4 MS Aiyaan-Esmer Encounter: [id number]    Microscopic hematuria   Assessment & Plan    Microscopic hematuria  Urine cytology, urine culture pending  Jarrell now out, continuing to void on his own without issue or symptom  Ultrasound with focal bladder wall thickening  Plan:  Await cytology and culture  Patient is asymptomatic without need for any treatment at this time  Recommend outpatient CT and cystoscopy for further upper tract imaging and direct visualization of his bladder given this microscopic hematuria as an inpatient  Subjective/Objective     Subjective:   Patient continues to report he is voiding well  No hesitancy or dysuria  He feels he is emptying his bladder  He denies any suprapubic pressure or flank pain  He denies any gross hematuria  Urine cytology & urine culture pending  Review of Systems   Constitutional: Negative for activity change and appetite change  HENT: Negative for congestion and ear pain  Eyes: Negative for pain  Respiratory: Negative for cough and shortness of breath  Cardiovascular: Negative for chest pain and palpitations  Gastrointestinal: Negative for abdominal distention, abdominal pain, blood in stool, constipation, diarrhea and nausea  Genitourinary: Negative for difficulty urinating, dysuria, flank pain, frequency, hematuria, penile pain, penile swelling and urgency  Musculoskeletal: Negative for arthralgias and myalgias  Skin: Negative for rash  Allergic/Immunologic: Negative for immunocompromised state  Neurological: Negative for dizziness and headaches  Hematological: Negative for adenopathy  Does not bruise/bleed easily  Psychiatric/Behavioral: Negative for agitation  The patient is not nervous/anxious  Objective:  Vitals: Blood pressure 127/78, pulse 80, temperature 98 1 °F (36 7 °C), temperature source Tympanic, resp   rate 18, height 6' 2" (1 88 m), weight 97 7 kg (215 lb 6 2 oz), SpO2 95 %  ,Body mass index is 27 65 kg/m²  Intake/Output Summary (Last 24 hours) at 08/06/18 1140  Last data filed at 08/05/18 1328   Gross per 24 hour   Intake                0 ml   Output              240 ml   Net             -240 ml     Invasive Devices     Peripheral Intravenous Line            Peripheral IV 08/03/18 Left Forearm 2 days                Physical Exam   Constitutional: He is oriented to person, place, and time  He appears well-developed and well-nourished  He is cooperative  He does not appear ill  No distress  68-year-old male, no acute distress  HENT:   Head: Normocephalic and atraumatic  Moist mucous membranes  Eyes: Conjunctivae and EOM are normal    Neck: Normal range of motion  Neck supple  No tracheal deviation present  Cardiovascular:   No murmur heard  Irregularly irregular   Pulmonary/Chest: Effort normal and breath sounds normal  No respiratory distress  He has no wheezes  Good airflow bilaterally on deep inspiration  Abdominal: Soft  Bowel sounds are normal  He exhibits no distension and no mass  There is no tenderness  Abdomen soft without rigidity rebound or guarding  No suprapubic tenderness  Bilateral flank without CVA tenderness  Genitourinary:   Genitourinary Comments: External genitalia without significant lesions  Normal scrotal contents  Musculoskeletal: Normal range of motion  He exhibits no edema  Neurological: He is alert and oriented to person, place, and time  Skin: Skin is warm and dry  No rash noted  He is not diaphoretic  No erythema  No pallor  Psychiatric: He has a normal mood and affect  His behavior is normal  Judgment and thought content normal    Appears depressed and frustrated  Nursing note and vitals reviewed      History:    Past Medical History:   Diagnosis Date    Cardiac disease     [facility]congestive [facility] failure) [facility] [address] [facility]     Coronary artery disease     Diabetes mellitus ([address] )     Disease of thyroid gland     Fracture, humerus     GERD (gastroesophageal reflux disease)     Hypertension     Prediabetes     Wrist fracture      Past Surgical History:   Procedure Laterality Date    CORONARY ARTERY BYPASS GRAFT      PA AMPUTATION METATARSAL+TOE,SINGLE Right 8/2/2018    Procedure: RAY RESECTION FOOT;  Surgeon: Lolis Antonio MD;  Location: [facility];  Service: Podiatry     History reviewed  No pertinent family history    Social History     Social History    Marital status: Single     Spouse name: N/A    Number of children: N/A    Years of education: N/A     Social History Main Topics    Smoking status: Former Smoker    Smokeless tobacco: Never Used    Alcohol use Yes      Comment: "very little"    Drug use: No    Sexual activity: Not Asked     Other Topics Concern    None     Social History Narrative    None       Labs:  Recent Labs      08/04/18 0517 08/05/18   0600 08/06/18   0505   WBC  10 93*  9 45  9 99     Recent Labs      08/04/18 0517 08/05/18   0600 08/06/18   0505   HGB  17 5*  17 6*  17 1*       Recent Labs      08/04/18 0517 08/05/18   0600 08/06/18   0505   CREATININE  1 06  0 99  0 99         Jessica Bingham PA-C  Date: 8/6/2018 Time: 11:40 AM

## 2018-08-07 VITALS
HEIGHT: 74 IN | BODY MASS INDEX: 27.64 KG/M2 | WEIGHT: 215.39 LBS | TEMPERATURE: 96.6 F | OXYGEN SATURATION: 97 % | RESPIRATION RATE: 18 BRPM | DIASTOLIC BLOOD PRESSURE: 68 MMHG | HEART RATE: 75 BPM | SYSTOLIC BLOOD PRESSURE: 118 MMHG

## 2018-08-07 PROBLEM — L03.115 CELLULITIS OF RIGHT FOOT: Status: RESOLVED | Noted: 2018-07-31 | Resolved: 2018-08-07

## 2018-08-07 PROBLEM — M86.371 CHRONIC MULTIFOCAL OSTEOMYELITIS OF RIGHT FOOT (HCC): Status: RESOLVED | Noted: 2018-07-31 | Resolved: 2018-08-07

## 2018-08-07 PROCEDURE — 99233 SBSQ HOSP IP/OBS HIGH 50: CPT | Performed by: INTERNAL MEDICINE

## 2018-08-07 RX ORDER — METOPROLOL SUCCINATE 25 MG/1
25 TABLET, EXTENDED RELEASE ORAL DAILY
Qty: 30 TABLET | Refills: 0 | Status: SHIPPED | OUTPATIENT
Start: 2018-08-08 | End: 2019-05-14

## 2018-08-07 RX ORDER — LEVOFLOXACIN 750 MG/1
750 TABLET ORAL EVERY 24 HOURS
Qty: 5 TABLET | Refills: 0 | Status: SHIPPED | OUTPATIENT
Start: 2018-08-07 | End: 2018-08-12

## 2018-08-07 RX ORDER — AMOXICILLIN 500 MG/1
500 TABLET, FILM COATED ORAL EVERY 8 HOURS
Qty: 15 TABLET | Refills: 0 | Status: SHIPPED | OUTPATIENT
Start: 2018-08-07 | End: 2018-08-12

## 2018-08-07 RX ORDER — SACCHAROMYCES BOULARDII 250 MG
250 CAPSULE ORAL 2 TIMES DAILY
Qty: 30 CAPSULE | Refills: 0 | Status: SHIPPED | OUTPATIENT
Start: 2018-08-07

## 2018-08-07 RX ORDER — ONDANSETRON 4 MG/1
4 TABLET, FILM COATED ORAL EVERY 8 HOURS PRN
Qty: 20 TABLET | Refills: 0 | Status: SHIPPED | OUTPATIENT
Start: 2018-08-07 | End: 2019-05-14

## 2018-08-07 RX ADMIN — DABIGATRAN ETEXILATE MESYLATE 150 MG: 150 CAPSULE ORAL at 09:12

## 2018-08-07 RX ADMIN — Medication 3.38 G: at 09:12

## 2018-08-07 RX ADMIN — COLCHICINE 0.6 MG: 0.6 TABLET, FILM COATED ORAL at 09:12

## 2018-08-07 RX ADMIN — METOPROLOL SUCCINATE 25 MG: 25 TABLET, EXTENDED RELEASE ORAL at 09:12

## 2018-08-07 RX ADMIN — GABAPENTIN 300 MG: 300 CAPSULE ORAL at 09:12

## 2018-08-07 RX ADMIN — ONDANSETRON 4 MG: 2 INJECTION INTRAMUSCULAR; INTRAVENOUS at 09:21

## 2018-08-07 RX ADMIN — Medication 250 MG: at 09:12

## 2018-08-07 RX ADMIN — FUROSEMIDE 40 MG: 40 TABLET ORAL at 09:12

## 2018-08-07 RX ADMIN — ALLOPURINOL 300 MG: 100 TABLET ORAL at 09:12

## 2018-08-07 RX ADMIN — EPLERENONE 25 MG: 25 TABLET, FILM COATED ORAL at 09:12

## 2018-08-07 RX ADMIN — Medication 3.38 G: at 04:30

## 2018-08-07 RX ADMIN — Medication 500 MG: at 09:12

## 2018-08-07 RX ADMIN — LEVOTHYROXINE SODIUM 50 MCG: 50 TABLET ORAL at 05:38

## 2018-08-07 NOTE — DISCHARGE INSTRUCTIONS
Podiatry instructions: For [facility]:  1  Apply santyl, dry sterile dressing to right foot wound 2-3 times per week  2  Please apply to digital eschars  3  Partial weight-bearing to the heel in a Darco wedge shoe right foot  4    Please go to your appointment with Dr Sushma Lynn on Friday 08/10/2018

## 2018-08-07 NOTE — SOCIAL WORK
MD is discharging pt today  Pt will need a RW  Referral made to [facility] and a message was left that pt will be picked up at 1300  Also paged Podiatry for script for a RW

## 2018-08-07 NOTE — NURSING NOTE
Pt agitated due to the fact he was informed this morning he would be discharged and has not yet been discharged  I spoke with the patient and informed him we were waiting for infectious disease to see him to ensure he went home on the correct antibiotics  Pt stated he was angry but he understood it was not our fault  Podiatry called per pt request to have a script for Zofran due to the antibiotics making him nauseous  Podiatry stated they will put the order in and the team will be working on his discharge  Will continue to hourly round on the pt  Call bell within reach

## 2018-08-07 NOTE — PROGRESS NOTES
Progress Note - Infectious Disease   Juan Antonio May 74 y o  male MRN: [id number]  Unit/Bed#: E4 -01 Encounter: [id number]      Impression/Recommendations:  1  Right foot cellulitis  No history of MDR infections   Seems to be improving on current antibiotics   No associated fevers   Patient remains systemically well, nontoxic   Now wound culture is positive for Pseudomonas and Enterococcus   Will change antibiotic to cover for these organisms with plan for short postoperative course   Blood cultures remain negative      - change IV Zosyn to oral amoxicillin 500 mg q 8 hours and oral Levaquin 750 mg Q 24 hours to complete 10 day postoperative course, through 8/12   - serial exams     2  Right foot 5th submetatarsal ulceration with underlying osteomyelitis   MRI suggestive of osteomyelitis of the 5th metatarsal head and suggestion of  associated septic arthritis  I explained to the patient that [place] is a very low probability of complete cure of infection with antibiotics alone   Patient is now status post removal of 5th metatarsal head   Remaining 5th metatarsal appeared healthy and viable   No purulence noted   Clean margin was sent for pathology which is negative for ongoing osteomyelitis        - antibiotic plan as above  - follow-up surgical plans  - close podiatry follow-up ongoing  Patient will follow-up as outpatient  - continue with local wound care and dressing changes      3  Peripheral vascular disease   Likely contributing to development of #2  Vascular surgery noted with no plans for intervention at this time       4  Diabetes mellitus type 2 with neuropathy   Also likely contributing to development of severe foot infection       5  Hyperbilirubinemia      6   Diarrhea  May be antibiotic related  Stool for C diff PCR is negative  Diarrhea has improved         Antibiotics:  IV Zosyn     Discussed above plan with patient and with Podiatry service    Stable from ID standpoint        Subjective:  Patient tolerating oral diet  No fevers or chills  Mild nausea  Diarrhea improving  Objective:  Vitals:  HR:  [75-80] 75  Resp:  [18] 18  BP: (118-121)/(68-77) 118/68  SpO2:  [94 %-97 %] 97 %  Temp (24hrs), Av °F (36 1 °C), Min:96 6 °F (35 9 °C), Max:97 4 °F (36 3 °C)  Current: Temperature: (!) 96 6 °F (35 9 °C)    Physical Exam:   General:  Well-nourished, well-developed, in no acute distress  Eyes:  Conjunctive clear with no hemorrhages or effusions  Oropharynx:  No ulcers, no lesions  Neck:  Supple, no lymphadenopathy  Lungs:  Clear to auscultation bilaterally, no accessory muscle use  Cardiac:  Regular rate and rhythm, no murmurs  Abdomen:  Soft, non-tender, non-distented  Extremities:  Chronic lower extremity venous stasis changes  Foot dressing intact  Skin:  No rashes, no ulcers  Neurological:  Moves all four extremities spontaneously, sensation grossly intact    Lab Results:  I have personally reviewed pertinent labs      Results from last 7 days  Lab Units 18  0505 18  0600 18  0517 18  0930 18  0438 18  0445   SODIUM mmol/L 135* 134* 137 135* 138 135*   POTASSIUM mmol/L 3 6 3 8 3 6 3 7 3 8 3 9   CHLORIDE mmol/L 98* 99* 99* 99* 99* 100   CO2 mmol/L 30 30 27 25 29 29   ANION GAP mmol/L 7 5 11 11 10 6   BUN mg/dL 20 23 22 21 20 18   CREATININE mg/dL 0 99 0 99 1 06 1 11 1 18 1 01   EGFR ml/min/1 73sq m 75 75 69 65 60 73   GLUCOSE RANDOM mg/dL 102 145* 144* 157* 122 97   CALCIUM mg/dL 8 4 8 9 9 1 9 4 9 0 9 1   AST U/L  --   --   --  31 25 22   ALT U/L  --   --   --  17 13 15   ALK PHOS U/L  --   --   --  94 96 105   TOTAL PROTEIN g/dL  --   --   --  7 8 7 2 6 9   BILIRUBIN TOTAL mg/dL  --   --   --  2 08* 1 57* 1 73*       Results from last 7 days  Lab Units 18  0505 18  0600 18  0517   WBC Thousand/uL 9 99 9 45 10 93*   HEMOGLOBIN g/dL 17 1* 17 6* 17 5*   PLATELETS Thousands/uL 187 165 157       Results from last 7 days  Lab Units 08/05/18  1111 08/03/18  1236 07/31/18  1742 07/31/18  1739 07/31/18  1619   BLOOD CULTURE   --   --  No Growth After 5 Days  No Growth After 5 Days  --    GRAM STAIN RESULT   --   --   --   --  No polys seen  2+ Gram positive cocci in pairs   URINE CULTURE  No Growth <1000 cfu/mL  --   --   --   --    WOUND CULTURE   --   --   --   --  1+ Growth of Pseudomonas aeruginosa*  2+ Growth of Enterococcus faecalis*  1+ Growth of    C DIFF TOXIN B   --  NEGATIVE for C difficle toxin by PCR    --   --   --        Imaging Studies:   I have personally reviewed pertinent imaging study reports and images in PACS  EKG, Pathology, and Other Studies:   I have personally reviewed pertinent reports

## 2018-08-07 NOTE — DISCHARGE SUMMARY
Discharge Summary   Subha Almaguer 74 y o  male MRN: [id number]  Unit/Bed#: E4 -01 Encounter: [id number]    Admission Date: 7/31/2018     Admitting Diagnosis: Cellulitis and abscess of right foot    HPI: Subha Almaguer is a 74 y o  male who presented with right foot ulcer and left 3rd toe ulcer that he has been seeing Dr Katie Clark for as an outpatient  He stated that these have been wounds for about 3 weeks and prior to that they were just calluses  He saw Dr Emily Veras in the office and was sent to the hospital given increasing redness  The patient denied vomiting, fevers, chills however he has had some nausea the past few days prior to admission  He weightbears in normal shoes  He stated he has diet-controlled diabetes but he has had neuropathy for a while and takes gabapentin for this  He stated he has a had a left lower extremity stent placed by Dr Jayson Borrego in the [facility] about 3 years ago and follows up with him regularly  Procedures Performed: Right 5th met head resection    Hospital Course:  Patient was admitted to the podiatry service on 07/31/2018 for right sub met 5 wound, and left 3rd digit wound  It was suspected that there was a bone infection in the right 5th metatarsal   Patient was empirically started on IV Ancef/PO Flagyl  Wound cultures, and blood cultures were also taken  Wound cultures final results showed Enterococcus, and Pseudomonas  Blood cultures showed no growth to date  X-rays of the right foot were taken which showed no osteomyelitis, however due to clinical suspicion of osteomyelitis an MRI was ordered  The right foot MRI showed edema of the right 5th metatarsal head suggesting osteomyelitis  Internal Medicine, and Infectious Disease were consulted for medical management/preoperative clearance, and antibiotic recommendations respectively  Patient was transition to [facility] for antibiotics per Infectious Disease    Patient went to the operating room on 08/02/2018 for a right 5th metatarsal head resection with Dr Nya Levin  Patient was deemed medically cleared for discharge on 08/7/2018  Patient will be discharged with oral antibiotics per Infectious Disease recommendations  He was instructed to take the antibiotics as prescribed  He was instructed to be nonweightbearing to the right foot  A roller walker was prescribed for the patient upon discharge  Significant Findings, Care, Treatment and Services Provided: As stated above    Complications: None    Discharge Diagnosis:  S/p right 5th metatarsal head resection    Condition at Discharge: stable     Discharge instructions/Information to patient and family:   See after visit summary for information provided to patient and family  Provisions for Follow-Up Care/Important appointments:    See after visit summary for information related to follow-up care and any pertinent home health orders  Disposition: Home    Planned Readmission: No    Discharge Statement   I spent 30 minutes discharging the patient  This time was spent on the day of discharge  I had direct contact with the patient on the day of discharge  The details of this patient's discharge     Discharge Medications:  See after visit summary for reconciled discharge medications provided to patient and family

## 2018-08-07 NOTE — PLAN OF CARE
Problem: GASTROINTESTINAL - ADULT  Goal: Minimal or absence of nausea and/or vomiting  INTERVENTIONS:  - Administer IV fluids as ordered to ensure adequate hydration  - Administer ordered antiemetic medications as needed  - Provide nonpharmacologic comfort measures as appropriate  - Advance diet as tolerated  - Nutrition services referral to assist patient with adequate nutrition and appropriate food choices   Outcome: Progressing      Problem: SKIN/TISSUE INTEGRITY - ADULT  Goal: Incision(s), wounds(s) or drain site(s) healing without S/S of infection  INTERVENTIONS  - Assess and document risk factors for skin impairment   - Assess and document dressing, incision, wound bed, drain sites and surrounding tissue  - Initiate Nutrition services consult and/or wound management as needed   Outcome: Progressing    Goal: Skin integrity remains intact  INTERVENTIONS  - Identify patients at risk for skin breakdown  - Assess and monitor skin integrity  - Assess and monitor nutrition and hydration status  - Monitor labs (i e  albumin)  - Assess for incontinence   - Turn and reposition patient  - Assist with mobility/ambulation  - Relieve pressure over bony prominences  - Avoid friction and shearing  - Provide appropriate hygiene as needed including keeping skin clean and dry  - Evaluate need for skin moisturizer/barrier cream  - Collaborate with interdisciplinary team (i e  Nutrition, Rehabilitation, etc )   - Patient/family teaching  Outcome: Progressing    Goal: Oral mucous membranes remain intact  INTERVENTIONS  - Assess oral mucosa and hygiene practices  - Implement preventative oral hygiene regimen  - Implement oral medicated treatments as ordered  - Initiate Nutrition services referral as needed  Outcome: Progressing      Problem: DISCHARGE PLANNING - CARE MANAGEMENT  Goal: Discharge to post-acute care or home with appropriate resources  INTERVENTIONS:  - Conduct assessment to determine patient/family and health care team treatment goals, and need for post-acute services based on payer coverage, community resources, and patient preferences, and barriers to discharge  - Address psychosocial, clinical, and financial barriers to discharge as identified in assessment in conjunction with the patient/family and health care team  - Arrange appropriate level of post-acute services according to patients   needs and preference and payer coverage in collaboration with the physician and health care team  - Communicate with and update the patient/family, physician, and health care team regarding progress on the discharge plan  - Arrange appropriate transportation to post-acute venues   Outcome: Progressing      Problem: Prexisting or High Potential for Compromised Skin Integrity  Goal: Skin integrity is maintained or improved  INTERVENTIONS:  - Identify patients at risk for skin breakdown  - Assess and monitor skin integrity  - Assess and monitor nutrition and hydration status  - Monitor labs (i e  albumin)  - Assess for incontinence   - Turn and reposition patient  - Assist with mobility/ambulation  - Relieve pressure over bony prominences  - Avoid friction and shearing  - Provide appropriate hygiene as needed including keeping skin clean and dry  - Evaluate need for skin moisturizer/barrier cream  - Collaborate with interdisciplinary team (i e  Nutrition, Rehabilitation, etc )   - Patient/family teaching   Outcome: Progressing      Problem: Potential for Falls  Goal: Patient will remain free of falls  INTERVENTIONS:  - Assess patient frequently for physical needs  -  Identify cognitive and physical deficits and behaviors that affect risk of falls    -  Vincent fall precautions as indicated by assessment   - Educate patient/family on patient safety including physical limitations  - Instruct patient to call for assistance with activity based on assessment  - Modify environment to reduce risk of injury  - Consider OT/PT consult to assist with strengthening/mobility   Outcome: Progressing

## 2018-08-07 NOTE — SOCIAL WORK
Deliver RW to pt and pt stated he wanted cane now  Explained to pt this was the recommendation from PT and Podiatry  Pt accepted RW and signed delivery form  The script and delivery form was fax to [facility]  Pt has a ride home

## 2018-08-07 NOTE — CONSULTS
Consulted for wound healing on right foot  Diet hx: Pt eats oatmeal or eggs w/ hood for breakfast, soup from [facility] for lunch, and home made meal includes wild caught salmon, chicken, beef vegetables  Discussed proteins, adequate fluid and calories to help w/wound healing, also discussed bob to help w/ wound healing  Pt was willing to try  Will order bob BID, recommendation is BID for 2 weeks  Offered protein snacks in between meal but pt didn't want  During visit pt appeared agitated and stated "I'm leaving today and if they don't let me they can call the "  Pt was also frustrated w/ the food industry  Pt stated "I avoid high fructose corn syrup and only eat grass fed beef, someone needs to go to the FDA and complain about our food system"  I was able to calm the pt  Pt didn't have any further questions or concerns

## 2018-08-07 NOTE — PROGRESS NOTES
Progress Note - Podiatry  Veneda Preethi 74 y o  male MRN: [id number]  Unit/Bed#: E4 -01 Encounter: [id number]    Assessment/Plan:  1  S/p R  5th met head resection on 8/2/18 by Dr Georgeanne Dubin with wound on the plantar aspect of the lateral forefoot  - clean margins form pathology are negative for inflammation  -will plan for possible D/c later today however will defer to ID for p o  Antibiotics, clearance from urology however urine culture was negative  -will speak to Case Management for visiting nurses 2-3 times a week for dressing changes  -c/w IV Zosyn per ID for an  -WBAT to heel DARCO wedge shoe  -pt approved for home PT per CM with walker  2  DTI R hallux and L 3rd toe tavares 1  -betadine paint  3  Hematuria  -urology awaiting cytology and culture, alfonso out, recommend outpatient CT and cystoscopy  4  CAD with Afib-c/w pradaxa, metoprolol  -s/p CABG  5  DM type 2 with neuropathy-HB A1c 6 1%  -diet controlled  6  Hypothyroidism  -continue with levothyroxine  7  HTN  -continue with metoprolol  8  PAD  -stent placement 2015  -leads from 07/31:  Left GTP 0; patient seen by vascular surgery and patient to follow up with his vascular doctor upon discharge  9  Diarrhea-C diff negative, colchicine decreased, possibly due to antibiotic  10  CHF  -c/w lasix    Subjective/Objective   Chief Complaint:   Chief Complaint   Patient presents with    Foot Swelling     pt c/o swelling and pain to right foot  thinks it is infected  just came back from a 9 days cruise  also states on the left foot one of the toes does not appear to be healing right from an old wound  Subjective: 74 y o  y/o male was seen and evaluated at bedside  Blood pressure 121/77, pulse 80, temperature (!) 97 4 °F (36 3 °C), temperature source Tympanic, resp  rate 18, height 6' 2" (1 88 m), weight 97 7 kg (215 lb 6 2 oz), SpO2 94 %  ,Body mass index is 27 65 kg/m²      Invasive Devices     Peripheral Intravenous Line            Peripheral IV 08/03/18 Left Forearm 3 days                Physical Exam:   General: Alert, cooperative and no distress  Lungs: Non labored breathing  Heart: Positive S1, S2  Abdomen: Soft, non-tender  Extremity:     Neurovascular status gross motor function baseline  Lab, Imaging and other studies:   CBC: No results found for: WBC, HGB, HCT, MCV, PLT, ADJUSTEDWBC, MCH, MCHC, RDW, MPV, NRBC    Imaging: I have personally reviewed pertinent films in PACS  EKG, Pathology, and Other Studies: I have personally reviewed pertinent reports    VTE Pharmacologic Prophylaxis: pradaxa

## 2019-05-10 ENCOUNTER — TRANSCRIBE ORDERS (OUTPATIENT)
Dept: ADMINISTRATIVE | Facility: HOSPITAL | Age: 74
End: 2019-05-10

## 2019-05-10 DIAGNOSIS — M86.9 OSTEOMYELITIS OF LEFT FOOT, UNSPECIFIED TYPE (HCC): Primary | ICD-10-CM

## 2019-05-13 ENCOUNTER — HOSPITAL ENCOUNTER (OUTPATIENT)
Dept: RADIOLOGY | Facility: IMAGING CENTER | Age: 74
Discharge: HOME | End: 2019-05-13
Payer: MEDICARE

## 2019-05-13 DIAGNOSIS — M86.9 OSTEOMYELITIS OF LEFT FOOT, UNSPECIFIED TYPE (HCC): ICD-10-CM

## 2019-05-13 PROCEDURE — 73718 MRI LOWER EXTREMITY W/O DYE: CPT

## 2019-05-14 ENCOUNTER — ANESTHESIA EVENT (INPATIENT)
Dept: PERIOP | Facility: HOSPITAL | Age: 74
DRG: 617 | End: 2019-05-14
Payer: MEDICARE

## 2019-05-14 ENCOUNTER — TELEPHONE (OUTPATIENT)
Dept: VASCULAR SURGERY | Facility: CLINIC | Age: 74
End: 2019-05-14

## 2019-05-14 ENCOUNTER — APPOINTMENT (INPATIENT)
Dept: RADIOLOGY | Facility: HOSPITAL | Age: 74
DRG: 617 | End: 2019-05-14
Payer: MEDICARE

## 2019-05-14 ENCOUNTER — HOSPITAL ENCOUNTER (INPATIENT)
Facility: HOSPITAL | Age: 74
LOS: 3 days | Discharge: HOME | DRG: 617 | End: 2019-05-17
Attending: EMERGENCY MEDICINE | Admitting: PODIATRIST
Payer: MEDICARE

## 2019-05-14 ENCOUNTER — APPOINTMENT (INPATIENT)
Dept: NON INVASIVE DIAGNOSTICS | Facility: HOSPITAL | Age: 74
DRG: 617 | End: 2019-05-14
Payer: MEDICARE

## 2019-05-14 DIAGNOSIS — M86.9 OSTEOMYELITIS (HCC): Primary | ICD-10-CM

## 2019-05-14 DIAGNOSIS — G57.93 NEUROPATHY OF BOTH FEET: ICD-10-CM

## 2019-05-14 PROBLEM — I73.9 PAD (PERIPHERAL ARTERY DISEASE) (HCC): Status: ACTIVE | Noted: 2019-05-14

## 2019-05-14 LAB
ALBUMIN SERPL BCP-MCNC: 4 G/DL (ref 3.5–5)
ALP SERPL-CCNC: 131 U/L (ref 46–116)
ALT SERPL W P-5'-P-CCNC: 17 U/L (ref 12–78)
ANION GAP SERPL CALCULATED.3IONS-SCNC: 8 MMOL/L (ref 4–13)
AST SERPL W P-5'-P-CCNC: 25 U/L (ref 5–45)
BASOPHILS # BLD AUTO: 0.07 THOUSANDS/ΜL (ref 0–0.1)
BASOPHILS NFR BLD AUTO: 1 % (ref 0–1)
BILIRUB SERPL-MCNC: 1.81 MG/DL (ref 0.2–1)
BUN SERPL-MCNC: 23 MG/DL (ref 5–25)
CALCIUM SERPL-MCNC: 9.8 MG/DL (ref 8.3–10.1)
CHLORIDE SERPL-SCNC: 100 MMOL/L (ref 100–108)
CO2 SERPL-SCNC: 30 MMOL/L (ref 21–32)
CREAT SERPL-MCNC: 1.05 MG/DL (ref 0.6–1.3)
CRP SERPL QL: 5.5 MG/L
EOSINOPHIL # BLD AUTO: 0.2 THOUSAND/ΜL (ref 0–0.61)
EOSINOPHIL NFR BLD AUTO: 2 % (ref 0–6)
ERYTHROCYTE [DISTWIDTH] IN BLOOD BY AUTOMATED COUNT: 15.9 % (ref 11.6–15.1)
ERYTHROCYTE [SEDIMENTATION RATE] IN BLOOD: 3 MM/HOUR (ref 0–10)
EST. AVERAGE GLUCOSE BLD GHB EST-MCNC: 134 MG/DL
GFR SERPL CREATININE-BSD FRML MDRD: 69 ML/MIN/1.73SQ M
GLUCOSE SERPL-MCNC: 139 MG/DL (ref 65–140)
HBA1C MFR BLD: 6.3 % (ref 4.2–6.3)
HCT VFR BLD AUTO: 49.8 % (ref 36.5–49.3)
HGB BLD-MCNC: 16.9 G/DL (ref 12–17)
IMM GRANULOCYTES # BLD AUTO: 0.04 THOUSAND/UL (ref 0–0.2)
IMM GRANULOCYTES NFR BLD AUTO: 1 % (ref 0–2)
LACTATE SERPL-SCNC: 1.5 MMOL/L (ref 0.5–2)
LYMPHOCYTES # BLD AUTO: 1.87 THOUSANDS/ΜL (ref 0.6–4.47)
LYMPHOCYTES NFR BLD AUTO: 22 % (ref 14–44)
MAGNESIUM SERPL-MCNC: 2.1 MG/DL (ref 1.6–2.6)
MCH RBC QN AUTO: 35 PG (ref 26.8–34.3)
MCHC RBC AUTO-ENTMCNC: 33.9 G/DL (ref 31.4–37.4)
MCV RBC AUTO: 103 FL (ref 82–98)
MONOCYTES # BLD AUTO: 0.77 THOUSAND/ΜL (ref 0.17–1.22)
MONOCYTES NFR BLD AUTO: 9 % (ref 4–12)
NEUTROPHILS # BLD AUTO: 5.53 THOUSANDS/ΜL (ref 1.85–7.62)
NEUTS SEG NFR BLD AUTO: 65 % (ref 43–75)
NRBC BLD AUTO-RTO: 0 /100 WBCS
PLATELET # BLD AUTO: 137 THOUSANDS/UL (ref 149–390)
PMV BLD AUTO: 10.6 FL (ref 8.9–12.7)
POTASSIUM SERPL-SCNC: 3.9 MMOL/L (ref 3.5–5.3)
PROT SERPL-MCNC: 8.1 G/DL (ref 6.4–8.2)
RBC # BLD AUTO: 4.83 MILLION/UL (ref 3.88–5.62)
SODIUM SERPL-SCNC: 138 MMOL/L (ref 136–145)
WBC # BLD AUTO: 8.48 THOUSAND/UL (ref 4.31–10.16)

## 2019-05-14 PROCEDURE — 86140 C-REACTIVE PROTEIN: CPT

## 2019-05-14 PROCEDURE — 71046 X-RAY EXAM CHEST 2 VIEWS: CPT

## 2019-05-14 PROCEDURE — 80053 COMPREHEN METABOLIC PANEL: CPT | Performed by: EMERGENCY MEDICINE

## 2019-05-14 PROCEDURE — 87040 BLOOD CULTURE FOR BACTERIA: CPT | Performed by: EMERGENCY MEDICINE

## 2019-05-14 PROCEDURE — 93925 LOWER EXTREMITY STUDY: CPT | Performed by: SURGERY

## 2019-05-14 PROCEDURE — 93923 UPR/LXTR ART STDY 3+ LVLS: CPT

## 2019-05-14 PROCEDURE — 93005 ELECTROCARDIOGRAM TRACING: CPT

## 2019-05-14 PROCEDURE — 99285 EMERGENCY DEPT VISIT HI MDM: CPT

## 2019-05-14 PROCEDURE — 99222 1ST HOSP IP/OBS MODERATE 55: CPT | Performed by: PHYSICIAN ASSISTANT

## 2019-05-14 PROCEDURE — 93922 UPR/L XTREMITY ART 2 LEVELS: CPT | Performed by: SURGERY

## 2019-05-14 PROCEDURE — 99283 EMERGENCY DEPT VISIT LOW MDM: CPT | Performed by: EMERGENCY MEDICINE

## 2019-05-14 PROCEDURE — 85652 RBC SED RATE AUTOMATED: CPT | Performed by: EMERGENCY MEDICINE

## 2019-05-14 PROCEDURE — 83036 HEMOGLOBIN GLYCOSYLATED A1C: CPT | Performed by: PODIATRIST

## 2019-05-14 PROCEDURE — 85025 COMPLETE CBC W/AUTO DIFF WBC: CPT | Performed by: EMERGENCY MEDICINE

## 2019-05-14 PROCEDURE — 93925 LOWER EXTREMITY STUDY: CPT

## 2019-05-14 PROCEDURE — 83735 ASSAY OF MAGNESIUM: CPT | Performed by: EMERGENCY MEDICINE

## 2019-05-14 PROCEDURE — 36415 COLL VENOUS BLD VENIPUNCTURE: CPT | Performed by: EMERGENCY MEDICINE

## 2019-05-14 PROCEDURE — 83605 ASSAY OF LACTIC ACID: CPT | Performed by: EMERGENCY MEDICINE

## 2019-05-14 RX ORDER — LEVOTHYROXINE SODIUM 0.05 MG/1
50 TABLET ORAL
Status: DISCONTINUED | OUTPATIENT
Start: 2019-05-15 | End: 2019-05-17 | Stop reason: HOSPADM

## 2019-05-14 RX ORDER — TRAMADOL HYDROCHLORIDE 50 MG/1
50 TABLET ORAL
Status: DISCONTINUED | OUTPATIENT
Start: 2019-05-14 | End: 2019-05-17 | Stop reason: HOSPADM

## 2019-05-14 RX ORDER — GABAPENTIN 300 MG/1
300 CAPSULE ORAL 3 TIMES DAILY
Status: DISCONTINUED | OUTPATIENT
Start: 2019-05-14 | End: 2019-05-17 | Stop reason: HOSPADM

## 2019-05-14 RX ORDER — ALLOPURINOL 100 MG/1
300 TABLET ORAL DAILY
Status: DISCONTINUED | OUTPATIENT
Start: 2019-05-15 | End: 2019-05-17 | Stop reason: HOSPADM

## 2019-05-14 RX ORDER — FUROSEMIDE 40 MG/1
40 TABLET ORAL DAILY
Status: DISCONTINUED | OUTPATIENT
Start: 2019-05-15 | End: 2019-05-17 | Stop reason: HOSPADM

## 2019-05-14 RX ORDER — SACCHAROMYCES BOULARDII 250 MG
250 CAPSULE ORAL 2 TIMES DAILY
Status: DISCONTINUED | OUTPATIENT
Start: 2019-05-14 | End: 2019-05-17 | Stop reason: HOSPADM

## 2019-05-14 RX ORDER — ACETAMINOPHEN 325 MG/1
650 TABLET ORAL EVERY 6 HOURS PRN
Status: DISCONTINUED | OUTPATIENT
Start: 2019-05-14 | End: 2019-05-17 | Stop reason: HOSPADM

## 2019-05-14 RX ORDER — DABIGATRAN ETEXILATE 150 MG/1
150 CAPSULE, COATED PELLETS ORAL 2 TIMES DAILY
Status: DISCONTINUED | OUTPATIENT
Start: 2019-05-14 | End: 2019-05-15

## 2019-05-14 RX ORDER — UREA 10 %
500 LOTION (ML) TOPICAL DAILY
Status: DISCONTINUED | OUTPATIENT
Start: 2019-05-15 | End: 2019-05-17 | Stop reason: HOSPADM

## 2019-05-14 RX ORDER — CEFAZOLIN SODIUM 2 G/50ML
2000 SOLUTION INTRAVENOUS EVERY 8 HOURS
Status: DISCONTINUED | OUTPATIENT
Start: 2019-05-14 | End: 2019-05-17 | Stop reason: HOSPADM

## 2019-05-14 RX ORDER — METOLAZONE 2.5 MG/1
2.5 TABLET ORAL 3 TIMES WEEKLY
Status: DISCONTINUED | OUTPATIENT
Start: 2019-05-15 | End: 2019-05-17 | Stop reason: HOSPADM

## 2019-05-14 RX ORDER — ONDANSETRON 2 MG/ML
4 INJECTION INTRAMUSCULAR; INTRAVENOUS EVERY 6 HOURS PRN
Status: DISCONTINUED | OUTPATIENT
Start: 2019-05-14 | End: 2019-05-17 | Stop reason: HOSPADM

## 2019-05-14 RX ORDER — COLCHICINE 0.6 MG/1
0.6 TABLET ORAL 2 TIMES DAILY
Status: DISCONTINUED | OUTPATIENT
Start: 2019-05-14 | End: 2019-05-17 | Stop reason: HOSPADM

## 2019-05-14 RX ORDER — DOCUSATE SODIUM 100 MG/1
100 CAPSULE, LIQUID FILLED ORAL 2 TIMES DAILY
Status: DISCONTINUED | OUTPATIENT
Start: 2019-05-14 | End: 2019-05-17 | Stop reason: HOSPADM

## 2019-05-14 RX ORDER — TRAMADOL HYDROCHLORIDE 50 MG/1
50 TABLET ORAL
Status: ON HOLD | COMMUNITY
End: 2019-05-17 | Stop reason: SDUPTHER

## 2019-05-14 RX ADMIN — COLCHICINE 0.6 MG: 0.6 TABLET, FILM COATED ORAL at 17:32

## 2019-05-14 RX ADMIN — CEFAZOLIN SODIUM 2000 MG: 2 SOLUTION INTRAVENOUS at 22:00

## 2019-05-14 RX ADMIN — GABAPENTIN 300 MG: 300 CAPSULE ORAL at 22:00

## 2019-05-14 RX ADMIN — DABIGATRAN ETEXILATE MESYLATE 150 MG: 150 CAPSULE ORAL at 17:32

## 2019-05-14 RX ADMIN — TRAMADOL HYDROCHLORIDE 50 MG: 50 TABLET, COATED ORAL at 22:23

## 2019-05-14 RX ADMIN — Medication 250 MG: at 17:32

## 2019-05-14 RX ADMIN — GABAPENTIN 300 MG: 300 CAPSULE ORAL at 17:32

## 2019-05-14 RX ADMIN — CEFAZOLIN SODIUM 2000 MG: 2 SOLUTION INTRAVENOUS at 13:30

## 2019-05-15 ENCOUNTER — APPOINTMENT (INPATIENT)
Dept: NON INVASIVE DIAGNOSTICS | Facility: HOSPITAL | Age: 74
DRG: 617 | End: 2019-05-15
Payer: MEDICARE

## 2019-05-15 ENCOUNTER — ANESTHESIA (INPATIENT)
Dept: PERIOP | Facility: HOSPITAL | Age: 74
DRG: 617 | End: 2019-05-15
Payer: MEDICARE

## 2019-05-15 ENCOUNTER — APPOINTMENT (INPATIENT)
Dept: RADIOLOGY | Facility: HOSPITAL | Age: 74
DRG: 617 | End: 2019-05-15
Payer: MEDICARE

## 2019-05-15 PROBLEM — I48.91 ATRIAL FIBRILLATION (HCC): Status: ACTIVE | Noted: 2019-05-15

## 2019-05-15 LAB
ALBUMIN SERPL BCP-MCNC: 3.5 G/DL (ref 3.5–5)
ALP SERPL-CCNC: 108 U/L (ref 46–116)
ALT SERPL W P-5'-P-CCNC: 12 U/L (ref 12–78)
ANION GAP SERPL CALCULATED.3IONS-SCNC: 7 MMOL/L (ref 4–13)
APTT PPP: 51 SECONDS (ref 26–38)
AST SERPL W P-5'-P-CCNC: 22 U/L (ref 5–45)
BASOPHILS # BLD AUTO: 0.05 THOUSANDS/ΜL (ref 0–0.1)
BASOPHILS NFR BLD AUTO: 1 % (ref 0–1)
BILIRUB SERPL-MCNC: 1.5 MG/DL (ref 0.2–1)
BUN SERPL-MCNC: 23 MG/DL (ref 5–25)
CALCIUM SERPL-MCNC: 9.5 MG/DL (ref 8.3–10.1)
CHLORIDE SERPL-SCNC: 104 MMOL/L (ref 100–108)
CO2 SERPL-SCNC: 29 MMOL/L (ref 21–32)
CREAT SERPL-MCNC: 1.11 MG/DL (ref 0.6–1.3)
EOSINOPHIL # BLD AUTO: 0.1 THOUSAND/ΜL (ref 0–0.61)
EOSINOPHIL NFR BLD AUTO: 2 % (ref 0–6)
ERYTHROCYTE [DISTWIDTH] IN BLOOD BY AUTOMATED COUNT: 16.2 % (ref 11.6–15.1)
GFR SERPL CREATININE-BSD FRML MDRD: 65 ML/MIN/1.73SQ M
GLUCOSE SERPL-MCNC: 118 MG/DL (ref 65–140)
HCT VFR BLD AUTO: 50 % (ref 36.5–49.3)
HGB BLD-MCNC: 16.5 G/DL (ref 12–17)
IMM GRANULOCYTES # BLD AUTO: 0.02 THOUSAND/UL (ref 0–0.2)
IMM GRANULOCYTES NFR BLD AUTO: 0 % (ref 0–2)
INR PPP: 1.58 (ref 0.86–1.17)
LYMPHOCYTES # BLD AUTO: 2.12 THOUSANDS/ΜL (ref 0.6–4.47)
LYMPHOCYTES NFR BLD AUTO: 33 % (ref 14–44)
MCH RBC QN AUTO: 34.4 PG (ref 26.8–34.3)
MCHC RBC AUTO-ENTMCNC: 33 G/DL (ref 31.4–37.4)
MCV RBC AUTO: 104 FL (ref 82–98)
MONOCYTES # BLD AUTO: 0.61 THOUSAND/ΜL (ref 0.17–1.22)
MONOCYTES NFR BLD AUTO: 9 % (ref 4–12)
NEUTROPHILS # BLD AUTO: 3.61 THOUSANDS/ΜL (ref 1.85–7.62)
NEUTS SEG NFR BLD AUTO: 55 % (ref 43–75)
NRBC BLD AUTO-RTO: 0 /100 WBCS
PLATELET # BLD AUTO: 122 THOUSANDS/UL (ref 149–390)
PMV BLD AUTO: 10.9 FL (ref 8.9–12.7)
POTASSIUM SERPL-SCNC: 4 MMOL/L (ref 3.5–5.3)
PROT SERPL-MCNC: 7.2 G/DL (ref 6.4–8.2)
PROTHROMBIN TIME: 19 SECONDS (ref 11.8–14.2)
QRS AXIS: 87 DEGREES
QRS AXIS: 89 DEGREES
QRSD INTERVAL: 94 MS
QRSD INTERVAL: 98 MS
QT INTERVAL: 406 MS
QT INTERVAL: 408 MS
QTC INTERVAL: 485 MS
QTC INTERVAL: 490 MS
RBC # BLD AUTO: 4.8 MILLION/UL (ref 3.88–5.62)
SODIUM SERPL-SCNC: 140 MMOL/L (ref 136–145)
T WAVE AXIS: -36 DEGREES
T WAVE AXIS: -39 DEGREES
VENTRICULAR RATE: 86 BPM
VENTRICULAR RATE: 87 BPM
WBC # BLD AUTO: 6.51 THOUSAND/UL (ref 4.31–10.16)

## 2019-05-15 PROCEDURE — 88304 TISSUE EXAM BY PATHOLOGIST: CPT | Performed by: PATHOLOGY

## 2019-05-15 PROCEDURE — 0Y6U0Z3 DETACHMENT AT LEFT 3RD TOE, LOW, OPEN APPROACH: ICD-10-PCS | Performed by: PODIATRIST

## 2019-05-15 PROCEDURE — 93010 ELECTROCARDIOGRAM REPORT: CPT | Performed by: INTERNAL MEDICINE

## 2019-05-15 PROCEDURE — 99223 1ST HOSP IP/OBS HIGH 75: CPT | Performed by: FAMILY MEDICINE

## 2019-05-15 PROCEDURE — 88305 TISSUE EXAM BY PATHOLOGIST: CPT | Performed by: PATHOLOGY

## 2019-05-15 PROCEDURE — 80053 COMPREHEN METABOLIC PANEL: CPT | Performed by: PODIATRIST

## 2019-05-15 PROCEDURE — 0Y6Q0Z3 DETACHMENT AT LEFT 1ST TOE, LOW, OPEN APPROACH: ICD-10-PCS | Performed by: PODIATRIST

## 2019-05-15 PROCEDURE — 99233 SBSQ HOSP IP/OBS HIGH 50: CPT | Performed by: PHYSICIAN ASSISTANT

## 2019-05-15 PROCEDURE — 73630 X-RAY EXAM OF FOOT: CPT

## 2019-05-15 PROCEDURE — 85610 PROTHROMBIN TIME: CPT | Performed by: PODIATRIST

## 2019-05-15 PROCEDURE — 88311 DECALCIFY TISSUE: CPT | Performed by: PATHOLOGY

## 2019-05-15 PROCEDURE — 85730 THROMBOPLASTIN TIME PARTIAL: CPT | Performed by: PODIATRIST

## 2019-05-15 PROCEDURE — 85025 COMPLETE CBC W/AUTO DIFF WBC: CPT | Performed by: PODIATRIST

## 2019-05-15 RX ORDER — FENTANYL CITRATE/PF 50 MCG/ML
25 SYRINGE (ML) INJECTION
Status: DISCONTINUED | OUTPATIENT
Start: 2019-05-15 | End: 2019-05-15 | Stop reason: HOSPADM

## 2019-05-15 RX ORDER — MAGNESIUM HYDROXIDE 1200 MG/15ML
LIQUID ORAL AS NEEDED
Status: DISCONTINUED | OUTPATIENT
Start: 2019-05-15 | End: 2019-05-15 | Stop reason: HOSPADM

## 2019-05-15 RX ORDER — SODIUM CHLORIDE 9 MG/ML
INJECTION, SOLUTION INTRAVENOUS CONTINUOUS PRN
Status: DISCONTINUED | OUTPATIENT
Start: 2019-05-15 | End: 2019-05-15 | Stop reason: SURG

## 2019-05-15 RX ORDER — PROPOFOL 10 MG/ML
INJECTION, EMULSION INTRAVENOUS AS NEEDED
Status: DISCONTINUED | OUTPATIENT
Start: 2019-05-15 | End: 2019-05-15 | Stop reason: SURG

## 2019-05-15 RX ORDER — MIDAZOLAM HYDROCHLORIDE 1 MG/ML
INJECTION INTRAMUSCULAR; INTRAVENOUS AS NEEDED
Status: DISCONTINUED | OUTPATIENT
Start: 2019-05-15 | End: 2019-05-15 | Stop reason: SURG

## 2019-05-15 RX ORDER — FENTANYL CITRATE 50 UG/ML
INJECTION, SOLUTION INTRAMUSCULAR; INTRAVENOUS AS NEEDED
Status: DISCONTINUED | OUTPATIENT
Start: 2019-05-15 | End: 2019-05-15 | Stop reason: SURG

## 2019-05-15 RX ADMIN — Medication 250 MG: at 18:24

## 2019-05-15 RX ADMIN — PROPOFOL 20 MG: 10 INJECTION, EMULSION INTRAVENOUS at 16:24

## 2019-05-15 RX ADMIN — FENTANYL CITRATE 50 MCG: 50 INJECTION, SOLUTION INTRAMUSCULAR; INTRAVENOUS at 16:16

## 2019-05-15 RX ADMIN — TRAMADOL HYDROCHLORIDE 50 MG: 50 TABLET, COATED ORAL at 22:26

## 2019-05-15 RX ADMIN — ALLOPURINOL 300 MG: 100 TABLET ORAL at 09:43

## 2019-05-15 RX ADMIN — SODIUM CHLORIDE: 0.9 INJECTION, SOLUTION INTRAVENOUS at 16:13

## 2019-05-15 RX ADMIN — FENTANYL CITRATE 50 MCG: 50 INJECTION, SOLUTION INTRAMUSCULAR; INTRAVENOUS at 16:19

## 2019-05-15 RX ADMIN — CEFAZOLIN SODIUM 2000 MG: 2 SOLUTION INTRAVENOUS at 12:38

## 2019-05-15 RX ADMIN — CEFAZOLIN SODIUM 2000 MG: 2 SOLUTION INTRAVENOUS at 22:26

## 2019-05-15 RX ADMIN — GABAPENTIN 300 MG: 300 CAPSULE ORAL at 09:43

## 2019-05-15 RX ADMIN — Medication 250 MG: at 09:43

## 2019-05-15 RX ADMIN — GABAPENTIN 300 MG: 300 CAPSULE ORAL at 22:26

## 2019-05-15 RX ADMIN — COLCHICINE 0.6 MG: 0.6 TABLET, FILM COATED ORAL at 18:24

## 2019-05-15 RX ADMIN — Medication 500 MG: at 09:43

## 2019-05-15 RX ADMIN — FUROSEMIDE 40 MG: 40 TABLET ORAL at 09:43

## 2019-05-15 RX ADMIN — MIDAZOLAM 1 MG: 1 INJECTION INTRAMUSCULAR; INTRAVENOUS at 16:16

## 2019-05-15 RX ADMIN — GABAPENTIN 300 MG: 300 CAPSULE ORAL at 18:24

## 2019-05-15 RX ADMIN — METOLAZONE 2.5 MG: 2.5 TABLET ORAL at 09:43

## 2019-05-15 RX ADMIN — LEVOTHYROXINE SODIUM 50 MCG: 50 TABLET ORAL at 05:12

## 2019-05-15 RX ADMIN — COLCHICINE 0.6 MG: 0.6 TABLET, FILM COATED ORAL at 09:43

## 2019-05-15 RX ADMIN — CEFAZOLIN SODIUM 2000 MG: 2 SOLUTION INTRAVENOUS at 05:13

## 2019-05-15 RX ADMIN — CEFAZOLIN SODIUM 2000 MG: 2 SOLUTION INTRAVENOUS at 16:15

## 2019-05-15 RX ADMIN — MIDAZOLAM 1 MG: 1 INJECTION INTRAMUSCULAR; INTRAVENOUS at 16:11

## 2019-05-16 PROCEDURE — 99232 SBSQ HOSP IP/OBS MODERATE 35: CPT | Performed by: PHYSICIAN ASSISTANT

## 2019-05-16 RX ADMIN — GABAPENTIN 300 MG: 300 CAPSULE ORAL at 21:55

## 2019-05-16 RX ADMIN — LEVOTHYROXINE SODIUM 50 MCG: 50 TABLET ORAL at 05:59

## 2019-05-16 RX ADMIN — ALLOPURINOL 300 MG: 100 TABLET ORAL at 08:06

## 2019-05-16 RX ADMIN — DOCUSATE SODIUM 100 MG: 100 CAPSULE, LIQUID FILLED ORAL at 17:39

## 2019-05-16 RX ADMIN — DOCUSATE SODIUM 100 MG: 100 CAPSULE, LIQUID FILLED ORAL at 08:06

## 2019-05-16 RX ADMIN — Medication 250 MG: at 08:06

## 2019-05-16 RX ADMIN — CEFAZOLIN SODIUM 2000 MG: 2 SOLUTION INTRAVENOUS at 21:55

## 2019-05-16 RX ADMIN — Medication 250 MG: at 17:39

## 2019-05-16 RX ADMIN — TRAMADOL HYDROCHLORIDE 50 MG: 50 TABLET, COATED ORAL at 21:55

## 2019-05-16 RX ADMIN — COLCHICINE 0.6 MG: 0.6 TABLET, FILM COATED ORAL at 17:39

## 2019-05-16 RX ADMIN — Medication 500 MG: at 08:06

## 2019-05-16 RX ADMIN — CEFAZOLIN SODIUM 2000 MG: 2 SOLUTION INTRAVENOUS at 13:05

## 2019-05-16 RX ADMIN — COLCHICINE 0.6 MG: 0.6 TABLET, FILM COATED ORAL at 08:05

## 2019-05-16 RX ADMIN — CEFAZOLIN SODIUM 2000 MG: 2 SOLUTION INTRAVENOUS at 05:59

## 2019-05-16 RX ADMIN — GABAPENTIN 300 MG: 300 CAPSULE ORAL at 17:00

## 2019-05-16 RX ADMIN — GABAPENTIN 300 MG: 300 CAPSULE ORAL at 08:06

## 2019-05-17 VITALS
HEIGHT: 74 IN | TEMPERATURE: 97.6 F | WEIGHT: 239.64 LBS | BODY MASS INDEX: 30.75 KG/M2 | SYSTOLIC BLOOD PRESSURE: 101 MMHG | OXYGEN SATURATION: 96 % | HEART RATE: 85 BPM | RESPIRATION RATE: 18 BRPM | DIASTOLIC BLOOD PRESSURE: 80 MMHG

## 2019-05-17 LAB
ANION GAP SERPL CALCULATED.3IONS-SCNC: 10 MMOL/L (ref 4–13)
BUN SERPL-MCNC: 20 MG/DL (ref 5–25)
CALCIUM SERPL-MCNC: 8.8 MG/DL (ref 8.3–10.1)
CHLORIDE SERPL-SCNC: 101 MMOL/L (ref 100–108)
CO2 SERPL-SCNC: 28 MMOL/L (ref 21–32)
CREAT SERPL-MCNC: 1.09 MG/DL (ref 0.6–1.3)
GFR SERPL CREATININE-BSD FRML MDRD: 66 ML/MIN/1.73SQ M
GLUCOSE SERPL-MCNC: 103 MG/DL (ref 65–140)
POTASSIUM SERPL-SCNC: 3.1 MMOL/L (ref 3.5–5.3)
SODIUM SERPL-SCNC: 139 MMOL/L (ref 136–145)

## 2019-05-17 PROCEDURE — 80048 BASIC METABOLIC PNL TOTAL CA: CPT | Performed by: PHYSICIAN ASSISTANT

## 2019-05-17 RX ORDER — TRAMADOL HYDROCHLORIDE 50 MG/1
50 TABLET ORAL
Qty: 10 TABLET | Refills: 0 | Status: SHIPPED | OUTPATIENT
Start: 2019-05-17 | End: 2019-05-27

## 2019-05-17 RX ORDER — DABIGATRAN ETEXILATE 150 MG/1
150 CAPSULE, COATED PELLETS ORAL EVERY 12 HOURS SCHEDULED
Status: DISCONTINUED | OUTPATIENT
Start: 2019-05-17 | End: 2019-05-17 | Stop reason: HOSPADM

## 2019-05-17 RX ORDER — POTASSIUM CHLORIDE 20 MEQ/1
40 TABLET, EXTENDED RELEASE ORAL ONCE
Status: COMPLETED | OUTPATIENT
Start: 2019-05-17 | End: 2019-05-17

## 2019-05-17 RX ORDER — CEPHALEXIN 500 MG/1
500 CAPSULE ORAL EVERY 6 HOURS SCHEDULED
Qty: 28 CAPSULE | Refills: 0 | Status: SHIPPED | OUTPATIENT
Start: 2019-05-17 | End: 2019-05-24

## 2019-05-17 RX ADMIN — Medication 500 MG: at 09:03

## 2019-05-17 RX ADMIN — COLCHICINE 0.6 MG: 0.6 TABLET, FILM COATED ORAL at 09:02

## 2019-05-17 RX ADMIN — GABAPENTIN 300 MG: 300 CAPSULE ORAL at 09:02

## 2019-05-17 RX ADMIN — DABIGATRAN ETEXILATE MESYLATE 150 MG: 150 CAPSULE ORAL at 09:02

## 2019-05-17 RX ADMIN — LEVOTHYROXINE SODIUM 50 MCG: 50 TABLET ORAL at 05:57

## 2019-05-17 RX ADMIN — POTASSIUM CHLORIDE 40 MEQ: 1500 TABLET, EXTENDED RELEASE ORAL at 09:02

## 2019-05-17 RX ADMIN — DOCUSATE SODIUM 100 MG: 100 CAPSULE, LIQUID FILLED ORAL at 09:02

## 2019-05-17 RX ADMIN — CEFAZOLIN SODIUM 2000 MG: 2 SOLUTION INTRAVENOUS at 05:57

## 2019-05-17 RX ADMIN — ALLOPURINOL 300 MG: 100 TABLET ORAL at 09:02

## 2019-05-17 RX ADMIN — Medication 250 MG: at 09:02

## 2019-05-19 LAB
BACTERIA BLD CULT: NORMAL
BACTERIA BLD CULT: NORMAL

## 2024-11-27 NOTE — PROGRESS NOTES
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  2  DTI R dorsal hallux  3  L 3rd distal digit wound ([place] 1)  4  Afib with Hx CABGx4 2011-c/w pradaxa  5  CHF  6  DM type 2 with neuropathy-diet-controlled, HbA1c: 6 1%  7  Hypothyroidism  8  HTN  9  Hyperbilirubinemia  10  PVD-LLE stent placed roughly 3 years ago  11  History of gout  12  Diarrhea     Code status:  Level 1  DVT prophylaxis: Pradaxa     Plan:  - Right foot dressing c/d/i  Will change tomorrow and assess surgical site  Will change L 3rd toe dressing tomorrow  - Diarrhea most likely due to colchicine  Recommend reducing dose  If diarrhea does not resolve, will consider stool sample for c  Diff    - No vascular intervention at this time  Will monitor wound healing  Patient will f/u with vascular outpatient  -  C/w IV zosyn for now  If no evidence of infection, will stop abx soon  - Intra-op path pending   - WBS: NWB R foot  Subjective/Objective   Chief Complaint:   Chief Complaint   Patient presents with    Foot Swelling     pt c/o swelling and pain to right foot  thinks it is infected  just came back from a 9 days cruise  also states on the left foot one of the toes does not appear to be healing right from an old wound  Subjective: 74 y o  y/o male was seen and evaluated at bedside  He states that he has been having dry heaves past couple days, and diarrhea the past 2 days  He states that he did not sleep very well last night  He thinks the diarrhea is being caused by his antibiotics  He Patient denies any recent vomiting, fever, chills, shortness of breath, chest pains      Blood pressure 140/83, pulse 96, temperature 97 6 °F (36 4 °C), temperature source [facility] tspine [facility] on feb 2016 to be pushed, waiting for response, I tried to call and got no response.   Temporal, resp  rate 18, height 6' 2" (1 88 m), weight 97 7 kg (215 lb 6 2 oz), SpO2 96 %  ,Body mass index is 27 65 kg/m²  Invasive Devices     Peripheral Intravenous Line            Peripheral IV 07/31/18 Left Antecubital 2 days                Physical Exam:   General: Alert, cooperative and no distress  Lungs: Non labored breathing  Heart: Positive S1, S2  Abdomen: Soft, non-tender  Extremity:     NVS at baseline B/l  MSK function at B/l  No calf tenderness noted B/l     RLE:   Dressing was c/d/i with no strike through to the outer dressing noted  Lab, Imaging and other studies:   CBC:   Lab Results   Component Value Date    WBC 11 63 (H) 08/03/2018    HGB 17 9 (H) 08/03/2018    HCT 51 8 (H) 08/03/2018     (H) 08/03/2018     08/03/2018    MCH 35 8 (H) 08/03/2018    MCHC 34 6 08/03/2018    RDW 16 1 (H) 08/03/2018    MPV 11 1 08/03/2018    NRBC 0 08/03/2018   , CMP: No results found for: NA, K, CL, CO2, ANIONGAP, BUN, CREATININE, GLUCOSE, CALCIUM, AST, ALT, ALKPHOS, PROT, ALBUMIN, BILITOT, EGFR    Imaging: I have personally reviewed pertinent films in PACS  EKG, Pathology, and Other Studies: I have personally reviewed pertinent reports    VTE Pharmacologic Prophylaxis:   Pradaxa

## (undated) DEVICE — SYRINGE 20ML LL

## (undated) DEVICE — PENCIL ELECTROSURG E-Z CLEAN -0035H

## (undated) DEVICE — BETHLEHEM UNIVERSAL  MIONR EXT: Brand: CARDINAL HEALTH

## (undated) DEVICE — NEEDLE 25G X 1 1/2

## (undated) DEVICE — SUT VICRYL 3-0 SH 27 IN J416H

## (undated) DEVICE — GLOVE SRG BIOGEL 7

## (undated) DEVICE — SPONGE LAP 18 X 18 IN

## (undated) DEVICE — STOCKINETTE REGULAR

## (undated) DEVICE — 2000CC GUARDIAN II: Brand: GUARDIAN

## (undated) DEVICE — NEEDLE 18 G X 1 1/2

## (undated) DEVICE — STRETCH BANDAGE: Brand: CURITY

## (undated) DEVICE — GLOVE INDICATOR PI UNDERGLOVE SZ 7.5 BLUE

## (undated) DEVICE — GLOVE INDICATOR PI UNDERGLOVE SZ 7 BLUE

## (undated) DEVICE — GLOVE SRG BIOGEL ECLIPSE 7

## (undated) DEVICE — SUT VICRYL 2-0 SH 27 IN UNDYED J417H

## (undated) DEVICE — SYRINGE 10ML LL

## (undated) DEVICE — COBAN 4 IN STERILE

## (undated) DEVICE — SUT PROLENE 3-0 PS1 18 IN 8663G

## (undated) DEVICE — OCCLUSIVE GAUZE STRIP,3% BISMUTH TRIBROMOPHENATE IN PETROLATUM BLEND: Brand: XEROFORM

## (undated) DEVICE — SPONGE PVP SCRUB WING STERILE

## (undated) DEVICE — GLOVE SRG BIOGEL 5.5

## (undated) DEVICE — BLADE SAGITTAL 25.6 X 9.5MM

## (undated) DEVICE — DRESSING XEROFORM 5 X 9

## (undated) DEVICE — CURITY STRETCH BANDAGE: Brand: CURITY

## (undated) DEVICE — ASTOUND STANDARD SURGICAL GOWN, XXL: Brand: CONVERTORS

## (undated) DEVICE — KERLIX BANDAGE ROLL: Brand: KERLIX

## (undated) DEVICE — GAUZE SPONGES,16 PLY: Brand: CURITY

## (undated) DEVICE — INTENDED FOR TISSUE SEPARATION, AND OTHER PROCEDURES THAT REQUIRE A SHARP SURGICAL BLADE TO PUNCTURE OR CUT.: Brand: BARD-PARKER ® CARBON RIB-BACK BLADES